# Patient Record
Sex: FEMALE | Race: WHITE | NOT HISPANIC OR LATINO | Employment: FULL TIME | URBAN - METROPOLITAN AREA
[De-identification: names, ages, dates, MRNs, and addresses within clinical notes are randomized per-mention and may not be internally consistent; named-entity substitution may affect disease eponyms.]

---

## 2019-04-12 ENCOUNTER — LAB (OUTPATIENT)
Dept: LAB | Facility: HOSPITAL | Age: 24
End: 2019-04-12

## 2019-04-12 ENCOUNTER — TRANSCRIBE ORDERS (OUTPATIENT)
Dept: LAB | Facility: HOSPITAL | Age: 24
End: 2019-04-12

## 2019-04-12 DIAGNOSIS — O02.1 MISSED ABORTION: Primary | ICD-10-CM

## 2019-04-12 DIAGNOSIS — O02.1 MISSED ABORTION: ICD-10-CM

## 2019-04-12 LAB — HCG INTACT+B SERPL-ACNC: 40.73 MIU/ML

## 2019-04-12 PROCEDURE — 36415 COLL VENOUS BLD VENIPUNCTURE: CPT

## 2019-04-12 PROCEDURE — 84702 CHORIONIC GONADOTROPIN TEST: CPT

## 2019-04-26 ENCOUNTER — LAB (OUTPATIENT)
Dept: LAB | Facility: HOSPITAL | Age: 24
End: 2019-04-26

## 2019-04-26 ENCOUNTER — TRANSCRIBE ORDERS (OUTPATIENT)
Dept: LAB | Facility: HOSPITAL | Age: 24
End: 2019-04-26

## 2019-04-26 DIAGNOSIS — O02.1 MISSED ABORTION: Primary | ICD-10-CM

## 2019-04-26 DIAGNOSIS — O02.1 MISSED ABORTION: ICD-10-CM

## 2019-04-26 LAB — HCG INTACT+B SERPL-ACNC: 0.68 MIU/ML

## 2019-04-26 PROCEDURE — 84702 CHORIONIC GONADOTROPIN TEST: CPT

## 2019-04-26 PROCEDURE — 36415 COLL VENOUS BLD VENIPUNCTURE: CPT

## 2020-02-05 ENCOUNTER — NURSE TRIAGE (OUTPATIENT)
Dept: CALL CENTER | Facility: HOSPITAL | Age: 25
End: 2020-02-05

## 2020-02-05 ENCOUNTER — HOSPITAL ENCOUNTER (EMERGENCY)
Facility: HOSPITAL | Age: 25
Discharge: HOME | End: 2020-02-05
Attending: EMERGENCY MEDICINE | Admitting: EMERGENCY MEDICINE

## 2020-02-05 VITALS
RESPIRATION RATE: 15 BRPM | HEIGHT: 63 IN | BODY MASS INDEX: 42.24 KG/M2 | HEART RATE: 93 BPM | WEIGHT: 238.4 LBS | OXYGEN SATURATION: 99 % | TEMPERATURE: 98.6 F | SYSTOLIC BLOOD PRESSURE: 118 MMHG | DIASTOLIC BLOOD PRESSURE: 78 MMHG

## 2020-02-05 DIAGNOSIS — O20.0 THREATENED MISCARRIAGE: Primary | ICD-10-CM

## 2020-02-05 LAB
ABO GROUP BLD: NORMAL
ALBUMIN SERPL-MCNC: 4.1 G/DL (ref 3.5–5.2)
ALBUMIN/GLOB SERPL: 1.2 G/DL
ALP SERPL-CCNC: 97 U/L (ref 39–117)
ALT SERPL W P-5'-P-CCNC: 19 U/L (ref 1–33)
ANION GAP SERPL CALCULATED.3IONS-SCNC: 11.9 MMOL/L (ref 5–15)
AST SERPL-CCNC: 22 U/L (ref 1–32)
B-HCG UR QL: NEGATIVE
BACTERIA UR QL AUTO: ABNORMAL /HPF
BASOPHILS # BLD AUTO: 0.08 10*3/MM3 (ref 0–0.2)
BASOPHILS NFR BLD AUTO: 0.8 % (ref 0–1.5)
BILIRUB SERPL-MCNC: 0.2 MG/DL (ref 0.2–1.2)
BILIRUB UR QL STRIP: NEGATIVE
BUN BLD-MCNC: 12 MG/DL (ref 6–20)
BUN/CREAT SERPL: 16.7 (ref 7–25)
CALCIUM SPEC-SCNC: 9.1 MG/DL (ref 8.6–10.5)
CHLORIDE SERPL-SCNC: 102 MMOL/L (ref 98–107)
CLARITY UR: CLEAR
CO2 SERPL-SCNC: 25.1 MMOL/L (ref 22–29)
COLOR UR: YELLOW
CREAT BLD-MCNC: 0.72 MG/DL (ref 0.57–1)
DEPRECATED RDW RBC AUTO: 40.7 FL (ref 37–54)
EOSINOPHIL # BLD AUTO: 0.13 10*3/MM3 (ref 0–0.4)
EOSINOPHIL NFR BLD AUTO: 1.4 % (ref 0.3–6.2)
ERYTHROCYTE [DISTWIDTH] IN BLOOD BY AUTOMATED COUNT: 13.2 % (ref 12.3–15.4)
GFR SERPL CREATININE-BSD FRML MDRD: 100 ML/MIN/1.73
GLOBULIN UR ELPH-MCNC: 3.5 GM/DL
GLUCOSE BLD-MCNC: 135 MG/DL (ref 65–99)
GLUCOSE UR STRIP-MCNC: NEGATIVE MG/DL
HCG INTACT+B SERPL-ACNC: 16.3 MIU/ML
HCT VFR BLD AUTO: 39.3 % (ref 34–46.6)
HGB BLD-MCNC: 12.8 G/DL (ref 12–15.9)
HGB UR QL STRIP.AUTO: ABNORMAL
HYALINE CASTS UR QL AUTO: ABNORMAL /LPF
IMM GRANULOCYTES # BLD AUTO: 0.02 10*3/MM3 (ref 0–0.05)
IMM GRANULOCYTES NFR BLD AUTO: 0.2 % (ref 0–0.5)
KETONES UR QL STRIP: NEGATIVE
LEUKOCYTE ESTERASE UR QL STRIP.AUTO: NEGATIVE
LYMPHOCYTES # BLD AUTO: 3.17 10*3/MM3 (ref 0.7–3.1)
LYMPHOCYTES NFR BLD AUTO: 33.2 % (ref 19.6–45.3)
MCH RBC QN AUTO: 27.6 PG (ref 26.6–33)
MCHC RBC AUTO-ENTMCNC: 32.6 G/DL (ref 31.5–35.7)
MCV RBC AUTO: 84.7 FL (ref 79–97)
MONOCYTES # BLD AUTO: 0.68 10*3/MM3 (ref 0.1–0.9)
MONOCYTES NFR BLD AUTO: 7.1 % (ref 5–12)
NEUTROPHILS # BLD AUTO: 5.46 10*3/MM3 (ref 1.7–7)
NEUTROPHILS NFR BLD AUTO: 57.3 % (ref 42.7–76)
NITRITE UR QL STRIP: NEGATIVE
NRBC BLD AUTO-RTO: 0 /100 WBC (ref 0–0.2)
PH UR STRIP.AUTO: 5.5 [PH] (ref 5–8)
PLATELET # BLD AUTO: 402 10*3/MM3 (ref 140–450)
PMV BLD AUTO: 9.3 FL (ref 6–12)
POTASSIUM BLD-SCNC: 3.5 MMOL/L (ref 3.5–5.2)
PROT SERPL-MCNC: 7.6 G/DL (ref 6–8.5)
PROT UR QL STRIP: NEGATIVE
RBC # BLD AUTO: 4.64 10*6/MM3 (ref 3.77–5.28)
RBC # UR: ABNORMAL /HPF
REF LAB TEST METHOD: ABNORMAL
RH BLD: NEGATIVE
SODIUM BLD-SCNC: 139 MMOL/L (ref 136–145)
SP GR UR STRIP: 1.02 (ref 1–1.03)
SQUAMOUS #/AREA URNS HPF: ABNORMAL /HPF
UROBILINOGEN UR QL STRIP: ABNORMAL
WBC NRBC COR # BLD: 9.54 10*3/MM3 (ref 3.4–10.8)
WBC UR QL AUTO: ABNORMAL /HPF

## 2020-02-05 PROCEDURE — 81025 URINE PREGNANCY TEST: CPT | Performed by: PHYSICIAN ASSISTANT

## 2020-02-05 PROCEDURE — 80053 COMPREHEN METABOLIC PANEL: CPT | Performed by: PHYSICIAN ASSISTANT

## 2020-02-05 PROCEDURE — 99283 EMERGENCY DEPT VISIT LOW MDM: CPT

## 2020-02-05 PROCEDURE — 36415 COLL VENOUS BLD VENIPUNCTURE: CPT

## 2020-02-05 PROCEDURE — 81001 URINALYSIS AUTO W/SCOPE: CPT | Performed by: PHYSICIAN ASSISTANT

## 2020-02-05 PROCEDURE — 84702 CHORIONIC GONADOTROPIN TEST: CPT | Performed by: PHYSICIAN ASSISTANT

## 2020-02-05 PROCEDURE — 86901 BLOOD TYPING SEROLOGIC RH(D): CPT | Performed by: PHYSICIAN ASSISTANT

## 2020-02-05 PROCEDURE — 85025 COMPLETE CBC W/AUTO DIFF WBC: CPT | Performed by: PHYSICIAN ASSISTANT

## 2020-02-05 PROCEDURE — 86900 BLOOD TYPING SEROLOGIC ABO: CPT | Performed by: PHYSICIAN ASSISTANT

## 2020-02-05 NOTE — ED PROVIDER NOTES
Subjective   24-year-old female presents with pregnancy and vaginal bleeding, she began to have spotting last night, and then had vaginal bleeding today, she is had to use a pad because of the bleeding.  She is approximately 5 weeks pregnant, her first appointment is in 2 weeks.  She is had previous miscarriage.  She had some lower abdominal cramping but no pain.  No other symptoms reported.      History provided by:  Patient   used: No        Review of Systems   Genitourinary: Positive for vaginal bleeding.   All other systems reviewed and are negative.      Past Medical History:   Diagnosis Date   • DVT (deep venous thrombosis) (CMS/Formerly [facility])        No Known Allergies    History reviewed. No pertinent surgical history.    History reviewed. No pertinent family history.    Social History     Socioeconomic History   • Marital status: Single     Spouse name: Not on file   • Number of children: Not on file   • Years of education: Not on file   • Highest education level: Not on file   Tobacco Use   • Smoking status: Never Smoker   Substance and Sexual Activity   • Alcohol use: Not Currently   • Drug use: Never           Objective   Physical Exam   Constitutional: She is oriented to person, place, and time. She appears well-developed and well-nourished.   HENT:   Head: Normocephalic.   Eyes: EOM are normal.   Neck: Normal range of motion. Neck supple.   Cardiovascular: Normal rate and regular rhythm.   Pulmonary/Chest: Effort normal and breath sounds normal.   Abdominal: Soft. Bowel sounds are normal. She exhibits no distension. There is no tenderness.   Musculoskeletal: Normal range of motion.   Neurological: She is alert and oriented to person, place, and time. She has normal reflexes.   Skin: Skin is warm and dry.   Psychiatric: She has a normal mood and affect.   Nursing note and vitals reviewed.      Procedures           ED Course  ED Course as of Feb 05 1652   Wed Feb 05, 2020   1627 HCG  Quantitative: 16.30 [CS]   1627 HCG Quantitative: 16.30 [CS]   1650 Discussed with Dr. vera, she recommended keeping current appointment, hCG will be repeated    [CS]      ED Course User Index  [CS] Mane Ordaz Jr., PA-C                                               MDM  Number of Diagnoses or Management Options  Threatened miscarriage: new and requires workup     Amount and/or Complexity of Data Reviewed  Clinical lab tests: reviewed  Tests in the radiology section of CPT®: reviewed  Decide to obtain previous medical records or to obtain history from someone other than the patient: yes  Discuss the patient with other providers: yes    Risk of Complications, Morbidity, and/or Mortality  Presenting problems: minimal  Diagnostic procedures: minimal  Management options: minimal    Patient Progress  Patient progress: stable      Final diagnoses:   Threatened miscarriage            Mane Ordaz Jr., PA-C  02/05/20 [id number]

## 2020-02-05 NOTE — ED NOTES
Dr. Black was called per RUTHIE Gilliam. The call was transferred at this time.     Rossy Coombs  02/05/20 7877

## 2020-02-05 NOTE — TELEPHONE ENCOUNTER
"Gave number to medical records, [facility] department [phone].    Reason for Disposition  • Health Information question, no triage required and triager able to answer question    Additional Information  • Negative: [1] Caller is not with the adult (patient) AND [2] reporting urgent symptoms  • Negative: Lab result questions  • Negative: Medication questions  • Negative: Caller can't be reached by phone  • Negative: Caller has already spoken to PCP or another triager  • Negative: RN needs further essential information from caller in order to complete triage  • Negative: Requesting regular office appointment  • Negative: [1] Caller requesting NON-URGENT health information AND [2] PCP's office is the best resource    Answer Assessment - Initial Assessment Questions  1. REASON FOR CALL or QUESTION: \"What is your reason for calling today?\" or \"How can I best help you?\" or \"What question do you have that I can help answer?\"      HCG level results    Protocols used: INFORMATION ONLY CALL-ADULT-      "

## 2020-02-18 ENCOUNTER — OFFICE VISIT (OUTPATIENT)
Dept: OBSTETRICS AND GYNECOLOGY | Facility: CLINIC | Age: 25
End: 2020-02-18

## 2020-02-18 VITALS
BODY MASS INDEX: 42.52 KG/M2 | SYSTOLIC BLOOD PRESSURE: 112 MMHG | DIASTOLIC BLOOD PRESSURE: 72 MMHG | WEIGHT: 240 LBS | HEIGHT: 63 IN

## 2020-02-18 DIAGNOSIS — Z31.69 PRE-CONCEPTION COUNSELING: ICD-10-CM

## 2020-02-18 DIAGNOSIS — Z12.4 SCREENING FOR MALIGNANT NEOPLASM OF CERVIX: ICD-10-CM

## 2020-02-18 DIAGNOSIS — O36.80X0 ENCOUNTER TO DETERMINE FETAL VIABILITY OF PREGNANCY, SINGLE OR UNSPECIFIED FETUS: ICD-10-CM

## 2020-02-18 DIAGNOSIS — O03.9 SAB (SPONTANEOUS ABORTION): ICD-10-CM

## 2020-02-18 DIAGNOSIS — Z31.9 INFERTILITY MANAGEMENT: ICD-10-CM

## 2020-02-18 DIAGNOSIS — N96 HISTORY OF RECURRENT MISCARRIAGES: Primary | ICD-10-CM

## 2020-02-18 PROCEDURE — 99204 OFFICE O/P NEW MOD 45 MIN: CPT | Performed by: OBSTETRICS & GYNECOLOGY

## 2020-02-18 NOTE — PROGRESS NOTES
Subjective   Chief Complaint   Patient presents with   • Threatened Miscarriage     Was seen in ED on 20 for bleeding, had positive UPT in December and January. UPT in office today negative. TVS done today.     Noelle Astudillo is a 24 y.o. year old  presenting to be seen for possible miscarriage and infertility.    She presents today as a follow-up visit from the emergency room.  She had positive home pregnancy tests in December and January.  And hCG performed in the ED on  was 16.  She experienced heavy vaginal bleeding prior to and following that ED visit.  Her bleeding now has resolved.  Her UPT is negative in the office today.  She also experienced a miscarriage roughly 12 months ago.  She and her partner have been attempting conception for the past year.  Neither 1 has a pertinent medical history.  Neither one is taking medications on a daily basis.  She reports a regular monthly cycle that typically last for 4 days.  Her flow is moderate.  She denies significant pain symptoms with menses.  They would very much like to be pregnant in the near future.  She is taking a daily prenatal vitamin.    OB Hx: 2 first trimester miscarriages  Pap smear: , denies abnormals  Mammogram: never  Colonoscopy: never  DEXA Scan: never    She denies nausea, emesis, fevers, chills, significant weight changes, hair/skin/nail changes, dysuria, urinary frequency, palpitations, chest pain, headaches, myalgia, dyspnea.     History  Past Medical History:   Diagnosis Date   • DVT (deep venous thrombosis) (CMS/HCC)    • Urinary tract infection    , Past Surgical History:   Procedure Laterality Date   • VEIN LIGATION     , Family History   Problem Relation Age of Onset   • Hypertension Father    • Colon cancer Paternal Grandfather    • Prostate cancer Paternal Grandfather    • Clotting disorder Paternal Grandmother    • Diabetes Maternal Grandfather    • Heart disease Maternal Grandfather    • Breast cancer Paternal  "Aunt    , Social History     Tobacco Use   • Smoking status: Former Smoker   • Smokeless tobacco: Never Used   Substance Use Topics   • Alcohol use: Yes     Comment: 5 drinks monthly   • Drug use: Never   ,   (Not in a hospital admission) and Allergies:  Patient has no known allergies.    No current outpatient medications on file prior to visit.     No current facility-administered medications on file prior to visit.        Social History    Tobacco Use      Smoking status: Former Smoker      Smokeless tobacco: Never Used      Review of Systems  Pertinent items are noted in HPI, all other systems were reviewed and negative       Objective   /72   Ht 160 cm (63\")   Wt 109 kg (240 lb)   LMP 02/05/2020   Breastfeeding No   BMI 42.51 kg/m²     Physical Exam:  General Appearance: alert, pleasant, appears stated age, interactive and cooperative  Head: normocephalic, without obvious abnormality and atraumatic  Eyes: lids and lashes normal and no icterus  Ears: ears appear intact with no abnormalities noted  Nose: nares normal, septum midline, mucosa normal and no drainage  Neck: suppple, trachea midline and no thyromegaly  Back: no kyphosis present, no scoliosis present and range of motion normal  Lungs: respirations regular, respirations even and respirations unlabored, clear to auscultation bilaterally   Heart: regular rhythm and normal rate, normal S1, S2, no murmur, gallop, or rubs and no click  Breasts: Not performed.  Abdomen: no masses, no hepatomegaly, no splenomegaly, soft non-tender, no guarding and no rebound tenderness  Extremities: moves extremities well, no edema, no cyanosis and no redness  Skin: no bleeding, bruising or rash and no lesions noted  Lymph Nodes: no palpable adenopathy  Neurologic: Cranial Nerves cranial nerves 2 - 12 grossly intact, Speech normal content and execusion, Coordination normal  Psych: normal mood and affect, oriented to person, time and place, thought content organized " and appropriate judgment    Pelvis:  Pelvic: Clinical staff was present for exam  External genitalia:  normal appearance of the external genitalia including Bartholin's and [place]'s glands.  :  urethral meatus normal;  Vagina:  normal pink mucosa without prolapse or lesions.  Cervix:  normal appearance.  Uterus:  normal size, shape and consistency.  Adnexa:  normal bimanual exam of the adnexa.  Rectal:  digital rectal exam not performed; anus visually normal appearing.    Review of Labs:   HCG     Review of Imaging:  Pelvic ultrasound report    Decision to obtain old records:  No.    Summarization of old records:  N/A    Independent review of image, tracing or specimen:  A pelvic ultrasound was ordered and independently reviewed today. Normal sized, anteverted uterus with no masses.  No intrauterine pregnancy is visualized.  Both ovaries have multiple small follicles and normal vascularity.  Small moderate free fluid in the cul-de-sac.       Assessment   Spontaneous , completed  Recurrent first trimester spontaneous abortions  Infertility  Preconception counseling  Screening for malignancy of the cervix     Plan    Orders Placed This Encounter   Procedures   • NuSwab VG+ - Swab, Cervix   • US Ob Transvaginal     Order Specific Question:   Reason for Exam:     Answer:   dates, NOB   • Lupus Anticoagulant Panel     Medications ordered: none    Procedures performed: pap smear    We reviewed her situation in detail today.  Her ultrasound and exam are reassuring.  She is likely ovulatory.  Her partner is unlikely to be contributing a male factor.  A Pap smear and vaginal cultures were collected today.  Antiphospholipid antibody testing was discussed and the patient desires to have this panel drawn today.  We will discuss testing results by phone.  We discussed the possibility of Clomid as well.  All questions answered.    Hector Wilson MD  Obstetrics and Gynecology  [facility]

## 2020-02-20 LAB
A VAGINAE DNA VAG QL NAA+PROBE: NORMAL SCORE
BVAB2 DNA VAG QL NAA+PROBE: NORMAL SCORE
C ALBICANS DNA VAG QL NAA+PROBE: NEGATIVE
C GLABRATA DNA VAG QL NAA+PROBE: NEGATIVE
C TRACH RRNA SPEC QL NAA+PROBE: NEGATIVE
MEGA1 DNA VAG QL NAA+PROBE: NORMAL SCORE
N GONORRHOEA RRNA SPEC QL NAA+PROBE: NEGATIVE
T VAGINALIS RRNA SPEC QL NAA+PROBE: NEGATIVE

## 2020-02-25 DIAGNOSIS — Z12.4 SCREENING FOR MALIGNANT NEOPLASM OF CERVIX: ICD-10-CM

## 2020-02-25 LAB
APTT HEX PL PPP: 5 SEC
APTT IMM NP PPP: NORMAL SEC
APTT PPP 1:1 SALINE: NORMAL SEC
APTT PPP: 27.2 SEC
B2 GLYCOPROT1 IGA SER-ACNC: <10 SAU
B2 GLYCOPROT1 IGG SER-ACNC: <10 SGU
B2 GLYCOPROT1 IGM SER-ACNC: <10 SMU
CARDIOLIPIN IGG SER IA-ACNC: <10 GPL
CARDIOLIPIN IGM SER IA-ACNC: 18 MPL
CONFIRM APTT: 1.2 SEC
CONFIRM DRVVT: NORMAL SEC
DRVVT SCREEN TO CONFIRM RATIO: NORMAL RATIO
INR PPP: 0.9 RATIO
LABORATORY COMMENT REPORT: NORMAL
PROTHROMBIN TIME: 9.8 SEC
SCREEN DRVVT: 42.5 SEC
THROMBIN TIME: 17.1 SEC

## 2020-03-04 ENCOUNTER — TELEPHONE (OUTPATIENT)
Dept: OBSTETRICS AND GYNECOLOGY | Facility: CLINIC | Age: 25
End: 2020-03-04

## 2020-03-05 DIAGNOSIS — Z31.9 INFERTILITY MANAGEMENT: Primary | ICD-10-CM

## 2020-03-05 NOTE — TELEPHONE ENCOUNTER
----- Message from Hayley Bellamy sent at 3/4/2020  9:36 AM EST -----  Contact: PT  THIS IS DR LIU'S. PT.  SHE CALLED REQUESTING RX FOR CLOMID SINCE HER TESTING WAS NEGATIVE.  SHE USES [facility] IN [place].  THANKS  
Done.  Please review the following instructions with her:    Clomid Instructions  - Your first day of bleeding is considered Day 1 of your cycle.  - Take Clomid daily for 5 days; Days 5-9.  - Have sex every other day (at least) on Days 9-17.  - Come to the lab for blood work on Day 21 (If Sunday, come on Monday Day 22)  - We will call you with the results and next steps     
Pt informed  
Routed to Dr Wilson   
room air

## 2020-03-24 DIAGNOSIS — Z31.9 INFERTILITY MANAGEMENT: Primary | ICD-10-CM

## 2020-03-24 DIAGNOSIS — Z31.9 INFERTILITY MANAGEMENT: ICD-10-CM

## 2020-03-25 LAB — PROGEST SERPL-MCNC: 23.5 NG/ML

## 2020-04-03 DIAGNOSIS — Z31.9 INFERTILITY MANAGEMENT: ICD-10-CM

## 2021-03-23 ENCOUNTER — BULK ORDERING (OUTPATIENT)
Dept: CASE MANAGEMENT | Facility: OTHER | Age: 26
End: 2021-03-23

## 2021-03-23 DIAGNOSIS — Z23 IMMUNIZATION DUE: ICD-10-CM

## 2021-09-07 ENCOUNTER — HOSPITAL ENCOUNTER (EMERGENCY)
Dept: HOSPITAL 79 - ER1 | Age: 26
LOS: 1 days | Discharge: HOME | End: 2021-09-08
Payer: SELF-PAY

## 2021-09-07 DIAGNOSIS — K81.9: Primary | ICD-10-CM

## 2021-09-07 DIAGNOSIS — F17.200: ICD-10-CM

## 2021-09-07 LAB
BUN/CREATININE RATIO: 15 (ref 0–10)
HGB BLD-MCNC: 12.1 GM/DL (ref 12.3–15.3)
RED BLOOD COUNT: 4.89 M/UL (ref 4–5.1)
WHITE BLOOD COUNT: 16.4 K/UL (ref 4.5–11)

## 2021-11-12 ENCOUNTER — LAB (OUTPATIENT)
Dept: LAB | Facility: HOSPITAL | Age: 26
End: 2021-11-12

## 2021-11-12 ENCOUNTER — TRANSCRIBE ORDERS (OUTPATIENT)
Dept: LAB | Facility: HOSPITAL | Age: 26
End: 2021-11-12

## 2021-11-12 DIAGNOSIS — Z34.81 PRENATAL CARE, SUBSEQUENT PREGNANCY, FIRST TRIMESTER: ICD-10-CM

## 2021-11-12 DIAGNOSIS — Z34.81 PRENATAL CARE, SUBSEQUENT PREGNANCY, FIRST TRIMESTER: Primary | ICD-10-CM

## 2021-11-12 DIAGNOSIS — Z3A.12 12 WEEKS GESTATION OF PREGNANCY: ICD-10-CM

## 2021-11-12 LAB
ABO GROUP BLD: NORMAL
AMPHET+METHAMPHET UR QL: NEGATIVE
AMPHETAMINES UR QL: NEGATIVE
BACTERIA UR QL AUTO: ABNORMAL /HPF
BARBITURATES UR QL SCN: NEGATIVE
BASOPHILS # BLD AUTO: 0.05 10*3/MM3 (ref 0–0.2)
BASOPHILS NFR BLD AUTO: 0.5 % (ref 0–1.5)
BENZODIAZ UR QL SCN: NEGATIVE
BILIRUB UR QL STRIP: NEGATIVE
BLD GP AB SCN SERPL QL: NEGATIVE
BUPRENORPHINE SERPL-MCNC: NEGATIVE NG/ML
CANNABINOIDS SERPL QL: NEGATIVE
CLARITY UR: ABNORMAL
COCAINE UR QL: NEGATIVE
COLOR UR: YELLOW
DEPRECATED RDW RBC AUTO: 41.4 FL (ref 37–54)
EOSINOPHIL # BLD AUTO: 0.08 10*3/MM3 (ref 0–0.4)
EOSINOPHIL NFR BLD AUTO: 0.9 % (ref 0.3–6.2)
ERYTHROCYTE [DISTWIDTH] IN BLOOD BY AUTOMATED COUNT: 14.4 % (ref 12.3–15.4)
GLUCOSE UR STRIP-MCNC: NEGATIVE MG/DL
HCT VFR BLD AUTO: 34.6 % (ref 34–46.6)
HGB BLD-MCNC: 11.4 G/DL (ref 12–15.9)
HGB UR QL STRIP.AUTO: NEGATIVE
HYALINE CASTS UR QL AUTO: ABNORMAL /LPF
IMM GRANULOCYTES # BLD AUTO: 0.02 10*3/MM3 (ref 0–0.05)
IMM GRANULOCYTES NFR BLD AUTO: 0.2 % (ref 0–0.5)
KETONES UR QL STRIP: NEGATIVE
LEUKOCYTE ESTERASE UR QL STRIP.AUTO: ABNORMAL
LYMPHOCYTES # BLD AUTO: 2.3 10*3/MM3 (ref 0.7–3.1)
LYMPHOCYTES NFR BLD AUTO: 24.5 % (ref 19.6–45.3)
MCH RBC QN AUTO: 26.5 PG (ref 26.6–33)
MCHC RBC AUTO-ENTMCNC: 32.9 G/DL (ref 31.5–35.7)
MCV RBC AUTO: 80.5 FL (ref 79–97)
METHADONE UR QL SCN: NEGATIVE
MONOCYTES # BLD AUTO: 0.45 10*3/MM3 (ref 0.1–0.9)
MONOCYTES NFR BLD AUTO: 4.8 % (ref 5–12)
NEUTROPHILS NFR BLD AUTO: 6.5 10*3/MM3 (ref 1.7–7)
NEUTROPHILS NFR BLD AUTO: 69.1 % (ref 42.7–76)
NITRITE UR QL STRIP: NEGATIVE
NRBC BLD AUTO-RTO: 0 /100 WBC (ref 0–0.2)
OPIATES UR QL: NEGATIVE
OXYCODONE UR QL SCN: NEGATIVE
PCP UR QL SCN: NEGATIVE
PH UR STRIP.AUTO: 6 [PH] (ref 5–8)
PLATELET # BLD AUTO: 304 10*3/MM3 (ref 140–450)
PMV BLD AUTO: 11.7 FL (ref 6–12)
PROPOXYPH UR QL: NEGATIVE
PROT UR QL STRIP: NEGATIVE
RBC # BLD AUTO: 4.3 10*6/MM3 (ref 3.77–5.28)
RBC # UR: ABNORMAL /HPF
REF LAB TEST METHOD: ABNORMAL
RH BLD: NEGATIVE
SP GR UR STRIP: 1.02 (ref 1–1.03)
SQUAMOUS #/AREA URNS HPF: ABNORMAL /HPF
TRICYCLICS UR QL SCN: NEGATIVE
UROBILINOGEN UR QL STRIP: ABNORMAL
WBC # BLD AUTO: 9.4 10*3/MM3 (ref 3.4–10.8)
WBC UR QL AUTO: ABNORMAL /HPF

## 2021-11-12 PROCEDURE — 85025 COMPLETE CBC W/AUTO DIFF WBC: CPT

## 2021-11-12 PROCEDURE — 86900 BLOOD TYPING SEROLOGIC ABO: CPT

## 2021-11-12 PROCEDURE — G0432 EIA HIV-1/HIV-2 SCREEN: HCPCS

## 2021-11-12 PROCEDURE — 81001 URINALYSIS AUTO W/SCOPE: CPT

## 2021-11-12 PROCEDURE — 82947 ASSAY GLUCOSE BLOOD QUANT: CPT

## 2021-11-12 PROCEDURE — 80081 OBSTETRIC PANEL INC HIV TSTG: CPT

## 2021-11-12 PROCEDURE — 36415 COLL VENOUS BLD VENIPUNCTURE: CPT

## 2021-11-12 PROCEDURE — 86850 RBC ANTIBODY SCREEN: CPT

## 2021-11-12 PROCEDURE — 86901 BLOOD TYPING SEROLOGIC RH(D): CPT

## 2021-11-12 PROCEDURE — 86803 HEPATITIS C AB TEST: CPT

## 2021-11-12 PROCEDURE — 80306 DRUG TEST PRSMV INSTRMNT: CPT

## 2021-11-12 PROCEDURE — 87340 HEPATITIS B SURFACE AG IA: CPT

## 2021-11-12 PROCEDURE — 87086 URINE CULTURE/COLONY COUNT: CPT

## 2021-11-12 PROCEDURE — 84443 ASSAY THYROID STIM HORMONE: CPT

## 2021-11-13 LAB
GLUCOSE SERPL-MCNC: 80 MG/DL (ref 65–99)
HBV SURFACE AG SERPL QL IA: NORMAL
HCV AB SER DONR QL: NORMAL
HIV1+2 AB SER QL: NORMAL
RPR SER QL: NORMAL
TSH SERPL DL<=0.05 MIU/L-ACNC: 0.53 UIU/ML (ref 0.27–4.2)

## 2021-11-14 LAB — BACTERIA SPEC AEROBE CULT: NO GROWTH

## 2021-11-15 LAB — RUBV IGG SERPL IA-ACNC: <0.9 INDEX

## 2022-05-16 PROBLEM — Z34.90 TERM PREGNANCY: Status: ACTIVE | Noted: 2022-05-16

## 2022-05-16 RX ORDER — SODIUM CHLORIDE 0.9 % (FLUSH) 0.9 %
3 SYRINGE (ML) INJECTION EVERY 12 HOURS SCHEDULED
Status: CANCELLED | OUTPATIENT
Start: 2022-05-16

## 2022-05-16 RX ORDER — LIDOCAINE HYDROCHLORIDE 10 MG/ML
5 INJECTION, SOLUTION EPIDURAL; INFILTRATION; INTRACAUDAL; PERINEURAL AS NEEDED
Status: CANCELLED | OUTPATIENT
Start: 2022-05-16

## 2022-05-16 RX ORDER — ACETAMINOPHEN 500 MG
1000 TABLET ORAL ONCE
Status: CANCELLED | OUTPATIENT
Start: 2022-05-16 | End: 2022-05-16

## 2022-05-16 RX ORDER — TRISODIUM CITRATE DIHYDRATE AND CITRIC ACID MONOHYDRATE 500; 334 MG/5ML; MG/5ML
30 SOLUTION ORAL ONCE
Status: CANCELLED | OUTPATIENT
Start: 2022-05-16 | End: 2022-05-16

## 2022-05-16 RX ORDER — SODIUM CHLORIDE 0.9 % (FLUSH) 0.9 %
3-10 SYRINGE (ML) INJECTION AS NEEDED
Status: CANCELLED | OUTPATIENT
Start: 2022-05-16

## 2022-05-16 RX ORDER — SODIUM CHLORIDE, SODIUM LACTATE, POTASSIUM CHLORIDE, CALCIUM CHLORIDE 600; 310; 30; 20 MG/100ML; MG/100ML; MG/100ML; MG/100ML
125 INJECTION, SOLUTION INTRAVENOUS CONTINUOUS
Status: CANCELLED | OUTPATIENT
Start: 2022-05-16

## 2022-05-17 ENCOUNTER — PRE-ADMISSION TESTING (OUTPATIENT)
Dept: PREADMISSION TESTING | Facility: HOSPITAL | Age: 27
End: 2022-05-17
Attending: OBSTETRICS & GYNECOLOGY

## 2022-05-17 VITALS — BODY MASS INDEX: 51.62 KG/M2 | HEIGHT: 62 IN | WEIGHT: 280.54 LBS

## 2022-05-17 LAB
ABO GROUP BLD: NORMAL
BLD GP AB SCN SERPL QL: NEGATIVE
DEPRECATED RDW RBC AUTO: 46.8 FL (ref 37–54)
ERYTHROCYTE [DISTWIDTH] IN BLOOD BY AUTOMATED COUNT: 15.8 % (ref 12.3–15.4)
HCT VFR BLD AUTO: 31.8 % (ref 34–46.6)
HGB BLD-MCNC: 10 G/DL (ref 12–15.9)
MCH RBC QN AUTO: 25.7 PG (ref 26.6–33)
MCHC RBC AUTO-ENTMCNC: 31.4 G/DL (ref 31.5–35.7)
MCV RBC AUTO: 81.7 FL (ref 79–97)
PLATELET # BLD AUTO: 264 10*3/MM3 (ref 140–450)
PMV BLD AUTO: 10.5 FL (ref 6–12)
RBC # BLD AUTO: 3.89 10*6/MM3 (ref 3.77–5.28)
RH BLD: NEGATIVE
SARS-COV-2 RNA PNL SPEC NAA+PROBE: NOT DETECTED
T&S EXPIRATION DATE: NORMAL
WBC NRBC COR # BLD: 10.64 10*3/MM3 (ref 3.4–10.8)

## 2022-05-17 PROCEDURE — 86901 BLOOD TYPING SEROLOGIC RH(D): CPT | Performed by: OBSTETRICS & GYNECOLOGY

## 2022-05-17 PROCEDURE — 86850 RBC ANTIBODY SCREEN: CPT | Performed by: OBSTETRICS & GYNECOLOGY

## 2022-05-17 PROCEDURE — 85027 COMPLETE CBC AUTOMATED: CPT | Performed by: OBSTETRICS & GYNECOLOGY

## 2022-05-17 PROCEDURE — U0004 COV-19 TEST NON-CDC HGH THRU: HCPCS | Performed by: OBSTETRICS & GYNECOLOGY

## 2022-05-17 PROCEDURE — 86900 BLOOD TYPING SEROLOGIC ABO: CPT | Performed by: OBSTETRICS & GYNECOLOGY

## 2022-05-17 RX ORDER — SODIUM CHLORIDE 0.9 % (FLUSH) 0.9 %
3-10 SYRINGE (ML) INJECTION AS NEEDED
Status: ACTIVE | OUTPATIENT
Start: 2022-05-17

## 2022-05-17 RX ORDER — ACETAMINOPHEN 500 MG
1000 TABLET ORAL ONCE
Status: SHIPPED | OUTPATIENT
Start: 2022-05-17

## 2022-05-17 RX ORDER — TRISODIUM CITRATE DIHYDRATE AND CITRIC ACID MONOHYDRATE 500; 334 MG/5ML; MG/5ML
30 SOLUTION ORAL ONCE
Status: SHIPPED | OUTPATIENT
Start: 2022-05-17

## 2022-05-17 RX ORDER — SODIUM CHLORIDE, SODIUM LACTATE, POTASSIUM CHLORIDE, CALCIUM CHLORIDE 600; 310; 30; 20 MG/100ML; MG/100ML; MG/100ML; MG/100ML
125 INJECTION, SOLUTION INTRAVENOUS CONTINUOUS
Status: SHIPPED | OUTPATIENT
Start: 2022-05-17

## 2022-05-17 RX ORDER — LIDOCAINE HYDROCHLORIDE 10 MG/ML
5 INJECTION, SOLUTION EPIDURAL; INFILTRATION; INTRACAUDAL; PERINEURAL AS NEEDED
Status: SHIPPED | OUTPATIENT
Start: 2022-05-17

## 2022-05-17 RX ORDER — SODIUM CHLORIDE 0.9 % (FLUSH) 0.9 %
3 SYRINGE (ML) INJECTION EVERY 12 HOURS SCHEDULED
Status: ACTIVE | OUTPATIENT
Start: 2022-05-17

## 2022-05-20 ENCOUNTER — HOSPITAL ENCOUNTER (INPATIENT)
Facility: HOSPITAL | Age: 27
LOS: 2 days | Discharge: HOME | End: 2022-05-22
Attending: OBSTETRICS & GYNECOLOGY | Admitting: OBSTETRICS & GYNECOLOGY

## 2022-05-20 ENCOUNTER — ANESTHESIA (OUTPATIENT)
Dept: LABOR AND DELIVERY | Facility: HOSPITAL | Age: 27
End: 2022-05-20

## 2022-05-20 ENCOUNTER — ANESTHESIA EVENT (OUTPATIENT)
Dept: LABOR AND DELIVERY | Facility: HOSPITAL | Age: 27
End: 2022-05-20

## 2022-05-20 DIAGNOSIS — O34.219 DELIVERED BY CESAREAN DELIVERY FOLLOWING PREVIOUS CESAREAN DELIVERY: Primary | ICD-10-CM

## 2022-05-20 LAB
HCT VFR BLD AUTO: 26 % (ref 34–46.6)
HGB BLD-MCNC: 8.3 G/DL (ref 12–15.9)

## 2022-05-20 PROCEDURE — 25010000002 ONDANSETRON PER 1 MG: Performed by: NURSE ANESTHETIST, CERTIFIED REGISTERED

## 2022-05-20 PROCEDURE — 25010000002 FENTANYL CITRATE (PF) 50 MCG/ML SOLUTION: Performed by: NURSE ANESTHETIST, CERTIFIED REGISTERED

## 2022-05-20 PROCEDURE — 25010000002 KETOROLAC TROMETHAMINE PER 15 MG: Performed by: OBSTETRICS & GYNECOLOGY

## 2022-05-20 PROCEDURE — 25010000002 MIDAZOLAM PER 1 MG: Performed by: NURSE ANESTHETIST, CERTIFIED REGISTERED

## 2022-05-20 PROCEDURE — 59025 FETAL NON-STRESS TEST: CPT

## 2022-05-20 PROCEDURE — 85014 HEMATOCRIT: CPT | Performed by: OBSTETRICS & GYNECOLOGY

## 2022-05-20 PROCEDURE — 0 MORPHINE PER 10 MG: Performed by: NURSE ANESTHETIST, CERTIFIED REGISTERED

## 2022-05-20 PROCEDURE — 85018 HEMOGLOBIN: CPT | Performed by: OBSTETRICS & GYNECOLOGY

## 2022-05-20 PROCEDURE — 25010000002 CEFAZOLIN PER 500 MG: Performed by: OBSTETRICS & GYNECOLOGY

## 2022-05-20 RX ORDER — TRISODIUM CITRATE DIHYDRATE AND CITRIC ACID MONOHYDRATE 500; 334 MG/5ML; MG/5ML
30 SOLUTION ORAL ONCE
Status: COMPLETED | OUTPATIENT
Start: 2022-05-20 | End: 2022-05-20

## 2022-05-20 RX ORDER — PROMETHAZINE HYDROCHLORIDE 12.5 MG/1
12.5 TABLET ORAL EVERY 6 HOURS PRN
Status: DISCONTINUED | OUTPATIENT
Start: 2022-05-20 | End: 2022-05-20 | Stop reason: HOSPADM

## 2022-05-20 RX ORDER — FENTANYL CITRATE 50 UG/ML
50 INJECTION, SOLUTION INTRAMUSCULAR; INTRAVENOUS
Status: CANCELLED | OUTPATIENT
Start: 2022-05-20

## 2022-05-20 RX ORDER — KETOROLAC TROMETHAMINE 15 MG/ML
15 INJECTION, SOLUTION INTRAMUSCULAR; INTRAVENOUS EVERY 6 HOURS
Status: COMPLETED | OUTPATIENT
Start: 2022-05-20 | End: 2022-05-21

## 2022-05-20 RX ORDER — IBUPROFEN 600 MG/1
600 TABLET ORAL EVERY 6 HOURS
Status: DISCONTINUED | OUTPATIENT
Start: 2022-05-21 | End: 2022-05-22 | Stop reason: HOSPADM

## 2022-05-20 RX ORDER — CARBOPROST TROMETHAMINE 250 UG/ML
250 INJECTION, SOLUTION INTRAMUSCULAR AS NEEDED
Status: DISCONTINUED | OUTPATIENT
Start: 2022-05-20 | End: 2022-05-20 | Stop reason: HOSPADM

## 2022-05-20 RX ORDER — MISOPROSTOL 200 UG/1
600 TABLET ORAL AS NEEDED
Status: DISCONTINUED | OUTPATIENT
Start: 2022-05-20 | End: 2022-05-22 | Stop reason: HOSPADM

## 2022-05-20 RX ORDER — EPHEDRINE SULFATE 50 MG/ML
INJECTION, SOLUTION INTRAVENOUS AS NEEDED
Status: DISCONTINUED | OUTPATIENT
Start: 2022-05-20 | End: 2022-05-20 | Stop reason: SURG

## 2022-05-20 RX ORDER — OXYCODONE HYDROCHLORIDE 5 MG/1
5 TABLET ORAL EVERY 4 HOURS PRN
Status: DISCONTINUED | OUTPATIENT
Start: 2022-05-20 | End: 2022-05-22 | Stop reason: HOSPADM

## 2022-05-20 RX ORDER — OXYTOCIN/0.9 % SODIUM CHLORIDE 30/500 ML
85 PLASTIC BAG, INJECTION (ML) INTRAVENOUS ONCE
Status: COMPLETED | OUTPATIENT
Start: 2022-05-20 | End: 2022-05-20

## 2022-05-20 RX ORDER — KETOROLAC TROMETHAMINE 30 MG/ML
30 INJECTION, SOLUTION INTRAMUSCULAR; INTRAVENOUS ONCE
Status: COMPLETED | OUTPATIENT
Start: 2022-05-20 | End: 2022-05-20

## 2022-05-20 RX ORDER — FAMOTIDINE 10 MG/ML
INJECTION, SOLUTION INTRAVENOUS AS NEEDED
Status: DISCONTINUED | OUTPATIENT
Start: 2022-05-20 | End: 2022-05-20 | Stop reason: SURG

## 2022-05-20 RX ORDER — ACETAMINOPHEN 500 MG
1000 TABLET ORAL ONCE
Status: COMPLETED | OUTPATIENT
Start: 2022-05-20 | End: 2022-05-20

## 2022-05-20 RX ORDER — METHYLERGONOVINE MALEATE 0.2 MG/ML
200 INJECTION INTRAVENOUS AS NEEDED
Status: DISCONTINUED | OUTPATIENT
Start: 2022-05-20 | End: 2022-05-22 | Stop reason: HOSPADM

## 2022-05-20 RX ORDER — MIDAZOLAM HYDROCHLORIDE 1 MG/ML
INJECTION INTRAMUSCULAR; INTRAVENOUS AS NEEDED
Status: DISCONTINUED | OUTPATIENT
Start: 2022-05-20 | End: 2022-05-20 | Stop reason: SURG

## 2022-05-20 RX ORDER — OXYTOCIN/0.9 % SODIUM CHLORIDE 30/500 ML
PLASTIC BAG, INJECTION (ML) INTRAVENOUS AS NEEDED
Status: DISCONTINUED | OUTPATIENT
Start: 2022-05-20 | End: 2022-05-20 | Stop reason: SURG

## 2022-05-20 RX ORDER — ONDANSETRON 2 MG/ML
4 INJECTION INTRAMUSCULAR; INTRAVENOUS ONCE AS NEEDED
Status: CANCELLED | OUTPATIENT
Start: 2022-05-20

## 2022-05-20 RX ORDER — MORPHINE SULFATE 0.5 MG/ML
INJECTION, SOLUTION EPIDURAL; INTRATHECAL; INTRAVENOUS AS NEEDED
Status: DISCONTINUED | OUTPATIENT
Start: 2022-05-20 | End: 2022-05-20 | Stop reason: SURG

## 2022-05-20 RX ORDER — ENOXAPARIN SODIUM 100 MG/ML
40 INJECTION SUBCUTANEOUS EVERY 24 HOURS
Status: DISCONTINUED | OUTPATIENT
Start: 2022-05-21 | End: 2022-05-22 | Stop reason: HOSPADM

## 2022-05-20 RX ORDER — FENTANYL CITRATE 50 UG/ML
INJECTION, SOLUTION INTRAMUSCULAR; INTRAVENOUS AS NEEDED
Status: DISCONTINUED | OUTPATIENT
Start: 2022-05-20 | End: 2022-05-20 | Stop reason: SURG

## 2022-05-20 RX ORDER — SODIUM CHLORIDE, SODIUM LACTATE, POTASSIUM CHLORIDE, CALCIUM CHLORIDE 600; 310; 30; 20 MG/100ML; MG/100ML; MG/100ML; MG/100ML
1000 INJECTION, SOLUTION INTRAVENOUS CONTINUOUS
Status: ACTIVE | OUTPATIENT
Start: 2022-05-20 | End: 2022-05-20

## 2022-05-20 RX ORDER — MISOPROSTOL 200 UG/1
800 TABLET ORAL AS NEEDED
Status: DISCONTINUED | OUTPATIENT
Start: 2022-05-20 | End: 2022-05-20 | Stop reason: HOSPADM

## 2022-05-20 RX ORDER — BUPIVACAINE HYDROCHLORIDE 7.5 MG/ML
INJECTION, SOLUTION INTRASPINAL AS NEEDED
Status: DISCONTINUED | OUTPATIENT
Start: 2022-05-20 | End: 2022-05-20 | Stop reason: SURG

## 2022-05-20 RX ORDER — ONDANSETRON 2 MG/ML
INJECTION INTRAMUSCULAR; INTRAVENOUS AS NEEDED
Status: DISCONTINUED | OUTPATIENT
Start: 2022-05-20 | End: 2022-05-20 | Stop reason: SURG

## 2022-05-20 RX ORDER — SODIUM CHLORIDE, SODIUM LACTATE, POTASSIUM CHLORIDE, CALCIUM CHLORIDE 600; 310; 30; 20 MG/100ML; MG/100ML; MG/100ML; MG/100ML
125 INJECTION, SOLUTION INTRAVENOUS CONTINUOUS
Status: DISCONTINUED | OUTPATIENT
Start: 2022-05-20 | End: 2022-05-22 | Stop reason: HOSPADM

## 2022-05-20 RX ORDER — HYDROXYZINE HYDROCHLORIDE 25 MG/1
50 TABLET, FILM COATED ORAL EVERY 6 HOURS PRN
Status: DISCONTINUED | OUTPATIENT
Start: 2022-05-20 | End: 2022-05-22 | Stop reason: HOSPADM

## 2022-05-20 RX ORDER — CALCIUM CARBONATE 200(500)MG
1 TABLET,CHEWABLE ORAL EVERY 4 HOURS PRN
Status: DISCONTINUED | OUTPATIENT
Start: 2022-05-20 | End: 2022-05-22 | Stop reason: HOSPADM

## 2022-05-20 RX ORDER — SODIUM CHLORIDE 0.9 % (FLUSH) 0.9 %
3-10 SYRINGE (ML) INJECTION AS NEEDED
Status: DISCONTINUED | OUTPATIENT
Start: 2022-05-20 | End: 2022-05-22 | Stop reason: HOSPADM

## 2022-05-20 RX ORDER — ACETAMINOPHEN 325 MG/1
650 TABLET ORAL EVERY 6 HOURS
Status: DISCONTINUED | OUTPATIENT
Start: 2022-05-21 | End: 2022-05-22 | Stop reason: HOSPADM

## 2022-05-20 RX ORDER — OXYCODONE HYDROCHLORIDE AND ACETAMINOPHEN 5; 325 MG/1; MG/1
1 TABLET ORAL EVERY 4 HOURS PRN
Status: DISCONTINUED | OUTPATIENT
Start: 2022-05-20 | End: 2022-05-20 | Stop reason: HOSPADM

## 2022-05-20 RX ORDER — DIPHENHYDRAMINE HYDROCHLORIDE 50 MG/ML
25 INJECTION INTRAMUSCULAR; INTRAVENOUS EVERY 4 HOURS PRN
Status: DISCONTINUED | OUTPATIENT
Start: 2022-05-20 | End: 2022-05-22 | Stop reason: HOSPADM

## 2022-05-20 RX ORDER — HYDROCORTISONE 25 MG/G
1 CREAM TOPICAL AS NEEDED
Status: DISCONTINUED | OUTPATIENT
Start: 2022-05-20 | End: 2022-05-22 | Stop reason: HOSPADM

## 2022-05-20 RX ORDER — OXYCODONE HYDROCHLORIDE 5 MG/1
10 TABLET ORAL EVERY 4 HOURS PRN
Status: DISCONTINUED | OUTPATIENT
Start: 2022-05-20 | End: 2022-05-22 | Stop reason: HOSPADM

## 2022-05-20 RX ORDER — SIMETHICONE 80 MG
80 TABLET,CHEWABLE ORAL 4 TIMES DAILY PRN
Status: DISCONTINUED | OUTPATIENT
Start: 2022-05-20 | End: 2022-05-22 | Stop reason: HOSPADM

## 2022-05-20 RX ORDER — DOCUSATE SODIUM 100 MG/1
100 CAPSULE, LIQUID FILLED ORAL 2 TIMES DAILY PRN
Status: DISCONTINUED | OUTPATIENT
Start: 2022-05-20 | End: 2022-05-22 | Stop reason: HOSPADM

## 2022-05-20 RX ORDER — PRENATAL VIT/IRON FUM/FOLIC AC 27MG-0.8MG
1 TABLET ORAL DAILY
Status: DISCONTINUED | OUTPATIENT
Start: 2022-05-20 | End: 2022-05-22 | Stop reason: HOSPADM

## 2022-05-20 RX ORDER — ACETAMINOPHEN 500 MG
1000 TABLET ORAL EVERY 6 HOURS
Status: COMPLETED | OUTPATIENT
Start: 2022-05-20 | End: 2022-05-21

## 2022-05-20 RX ORDER — PROMETHAZINE HYDROCHLORIDE 12.5 MG/1
12.5 TABLET ORAL EVERY 4 HOURS PRN
Status: DISCONTINUED | OUTPATIENT
Start: 2022-05-20 | End: 2022-05-22 | Stop reason: HOSPADM

## 2022-05-20 RX ORDER — SODIUM CHLORIDE 0.9 % (FLUSH) 0.9 %
3 SYRINGE (ML) INJECTION EVERY 12 HOURS SCHEDULED
Status: DISCONTINUED | OUTPATIENT
Start: 2022-05-20 | End: 2022-05-22 | Stop reason: HOSPADM

## 2022-05-20 RX ORDER — ALUMINA, MAGNESIA, AND SIMETHICONE 2400; 2400; 240 MG/30ML; MG/30ML; MG/30ML
15 SUSPENSION ORAL EVERY 4 HOURS PRN
Status: DISCONTINUED | OUTPATIENT
Start: 2022-05-20 | End: 2022-05-22 | Stop reason: HOSPADM

## 2022-05-20 RX ORDER — DIPHENHYDRAMINE HCL 25 MG
25 CAPSULE ORAL EVERY 4 HOURS PRN
Status: DISCONTINUED | OUTPATIENT
Start: 2022-05-20 | End: 2022-05-22 | Stop reason: HOSPADM

## 2022-05-20 RX ORDER — ONDANSETRON 4 MG/1
4 TABLET, FILM COATED ORAL EVERY 6 HOURS PRN
Status: DISCONTINUED | OUTPATIENT
Start: 2022-05-20 | End: 2022-05-20 | Stop reason: HOSPADM

## 2022-05-20 RX ORDER — METHYLERGONOVINE MALEATE 0.2 MG/ML
200 INJECTION INTRAVENOUS ONCE AS NEEDED
Status: DISCONTINUED | OUTPATIENT
Start: 2022-05-20 | End: 2022-05-20 | Stop reason: HOSPADM

## 2022-05-20 RX ORDER — CEFAZOLIN SODIUM IN 0.9 % NACL 3 G/100 ML
3 INTRAVENOUS SOLUTION, PIGGYBACK (ML) INTRAVENOUS ONCE
Status: COMPLETED | OUTPATIENT
Start: 2022-05-20 | End: 2022-05-20

## 2022-05-20 RX ORDER — ONDANSETRON 2 MG/ML
4 INJECTION INTRAMUSCULAR; INTRAVENOUS EVERY 6 HOURS PRN
Status: DISCONTINUED | OUTPATIENT
Start: 2022-05-20 | End: 2022-05-20 | Stop reason: HOSPADM

## 2022-05-20 RX ORDER — CARBOPROST TROMETHAMINE 250 UG/ML
250 INJECTION, SOLUTION INTRAMUSCULAR AS NEEDED
Status: DISCONTINUED | OUTPATIENT
Start: 2022-05-20 | End: 2022-05-22 | Stop reason: HOSPADM

## 2022-05-20 RX ORDER — NALOXONE HCL 0.4 MG/ML
0.4 VIAL (ML) INJECTION
Status: ACTIVE | OUTPATIENT
Start: 2022-05-20 | End: 2022-05-21

## 2022-05-20 RX ORDER — OXYTOCIN/0.9 % SODIUM CHLORIDE 30/500 ML
650 PLASTIC BAG, INJECTION (ML) INTRAVENOUS ONCE
Status: DISCONTINUED | OUTPATIENT
Start: 2022-05-20 | End: 2022-05-20 | Stop reason: HOSPADM

## 2022-05-20 RX ADMIN — SODIUM CITRATE AND CITRIC ACID MONOHYDRATE 30 ML: 500; 334 SOLUTION ORAL at 14:18

## 2022-05-20 RX ADMIN — ACETAMINOPHEN 1000 MG: 500 TABLET ORAL at 11:46

## 2022-05-20 RX ADMIN — MORPHINE SULFATE 0.15 MG: 0.5 INJECTION, SOLUTION EPIDURAL; INTRATHECAL; INTRAVENOUS at 14:27

## 2022-05-20 RX ADMIN — BUPIVACAINE HYDROCHLORIDE IN DEXTROSE 1.5 ML: 7.5 INJECTION, SOLUTION SUBARACHNOID at 14:27

## 2022-05-20 RX ADMIN — SODIUM CHLORIDE, POTASSIUM CHLORIDE, SODIUM LACTATE AND CALCIUM CHLORIDE: 600; 310; 30; 20 INJECTION, SOLUTION INTRAVENOUS at 14:52

## 2022-05-20 RX ADMIN — EPHEDRINE SULFATE 7.5 MG: 50 INJECTION, SOLUTION INTRAVENOUS at 14:35

## 2022-05-20 RX ADMIN — KETOROLAC TROMETHAMINE 15 MG: 15 INJECTION, SOLUTION INTRAMUSCULAR; INTRAVENOUS at 21:04

## 2022-05-20 RX ADMIN — FAMOTIDINE 20 MG: 10 INJECTION, SOLUTION INTRAVENOUS at 14:22

## 2022-05-20 RX ADMIN — FENTANYL CITRATE 20 MCG: 50 INJECTION, SOLUTION INTRAMUSCULAR; INTRAVENOUS at 14:27

## 2022-05-20 RX ADMIN — SODIUM CHLORIDE, POTASSIUM CHLORIDE, SODIUM LACTATE AND CALCIUM CHLORIDE: 600; 310; 30; 20 INJECTION, SOLUTION INTRAVENOUS at 14:20

## 2022-05-20 RX ADMIN — MIDAZOLAM 1 MG: 1 INJECTION INTRAMUSCULAR; INTRAVENOUS at 14:22

## 2022-05-20 RX ADMIN — Medication 85 ML/HR: at 16:06

## 2022-05-20 RX ADMIN — CEFAZOLIN 3 G: 10 INJECTION, POWDER, FOR SOLUTION INTRAVENOUS at 14:17

## 2022-05-20 RX ADMIN — ONDANSETRON 4 MG: 2 INJECTION INTRAMUSCULAR; INTRAVENOUS at 14:22

## 2022-05-20 RX ADMIN — KETOROLAC TROMETHAMINE 30 MG: 30 INJECTION, SOLUTION INTRAMUSCULAR; INTRAVENOUS at 16:04

## 2022-05-20 RX ADMIN — MIDAZOLAM 2 MG: 1 INJECTION INTRAMUSCULAR; INTRAVENOUS at 15:02

## 2022-05-20 RX ADMIN — OXYCODONE 10 MG: 5 TABLET ORAL at 19:42

## 2022-05-20 RX ADMIN — ACETAMINOPHEN 1000 MG: 500 TABLET ORAL at 17:22

## 2022-05-20 RX ADMIN — SODIUM CHLORIDE, POTASSIUM CHLORIDE, SODIUM LACTATE AND CALCIUM CHLORIDE 1000 ML: 600; 310; 30; 20 INJECTION, SOLUTION INTRAVENOUS at 14:16

## 2022-05-20 RX ADMIN — Medication 500 ML: at 14:51

## 2022-05-20 NOTE — ANESTHESIA POSTPROCEDURE EVALUATION
Patient: Noelle Astudillo    Procedure Summary     Date: 22 [facility] LAB[facility] DELIVERY   [facility]OR DELIV[facility]    Anesthesia Start:  Anesthesia Stop:     Procedure:  SECTION REPEAT (N/A Abdomen) Diagnosis:     Surgeons: Savi Rosa MD Provider: Adelso Martin MD    Anesthesia Type: spinal, ITN ASA Status: 2          Anesthesia Type: spinal, ITN    Vitals  Vitals Value Taken Time   /57 22 1515   Temp 97.5    Pulse 91    Resp 20    SpO2 95%            Post Anesthesia Care and Evaluation    Patient location during evaluation: bedside  Patient participation: complete - patient participated  Level of consciousness: awake and alert  Pain score: 0  Pain management: adequate  Airway patency: patent  Anesthetic complications: No anesthetic complications    Cardiovascular status: acceptable  Respiratory status: acceptable  Hydration status: acceptable

## 2022-05-20 NOTE — ANESTHESIA PROCEDURE NOTES
Spinal Block      Patient reassessed immediately prior to procedure    Indication:procedure for pain  Performed By  CRNA/CAA: Monisha Lemos CRNA  Preanesthetic Checklist  Completed: patient identified, IV checked, risks and benefits discussed, surgical consent, monitors and equipment checked, pre-op evaluation and timeout performed  Spinal Block Prep:  Patient Position:sitting  Sterile Tech:cap, gloves, mask and sterile barriers  Prep:Betadine  Patient Monitoring:blood pressure monitoring, continuous pulse oximetry and EKG  Spinal Block Procedure  Approach:midline  Guidance:palpation technique  Location:L2-L3  Needle Type:Minerva  Needle Gauge:25 G  Placement of Spinal needle event:cerebrospinal fluid aspirated  Paresthesia: no  Fluid Appearance:clear     Post Assessment  Patient Tolerance:patient tolerated the procedure well with no apparent complications  Complications no

## 2022-05-20 NOTE — ANESTHESIA PREPROCEDURE EVALUATION
Anesthesia Evaluation     Patient summary reviewed and Nursing notes reviewed   NPO Solid Status: > 8 hours  NPO Liquid Status: > 8 hours           Airway   Dental      Pulmonary - negative pulmonary ROS   Cardiovascular     (+) DVT,       Neuro/Psych- negative ROS  GI/Hepatic/Renal/Endo    (+) obesity, morbid obesity,      Musculoskeletal (-) negative ROS    Abdominal    Substance History - negative use     OB/GYN    (+) Pregnant,         Other                        Anesthesia Plan    ASA 2     spinal and ITN       Anesthetic plan, all risks, benefits, and alternatives have been provided, discussed and informed consent has been obtained with: patient.        CODE STATUS:

## 2022-05-21 PROBLEM — Z34.90 TERM PREGNANCY: Status: RESOLVED | Noted: 2022-05-16 | Resolved: 2022-05-21

## 2022-05-21 LAB
BASOPHILS # BLD AUTO: 0.06 10*3/MM3 (ref 0–0.2)
BASOPHILS NFR BLD AUTO: 0.6 % (ref 0–1.5)
DEPRECATED RDW RBC AUTO: 45.7 FL (ref 37–54)
EOSINOPHIL # BLD AUTO: 0.17 10*3/MM3 (ref 0–0.4)
EOSINOPHIL NFR BLD AUTO: 1.6 % (ref 0.3–6.2)
ERYTHROCYTE [DISTWIDTH] IN BLOOD BY AUTOMATED COUNT: 15.9 % (ref 12.3–15.4)
HCT VFR BLD AUTO: 26.9 % (ref 34–46.6)
HGB BLD-MCNC: 8.5 G/DL (ref 12–15.9)
IMM GRANULOCYTES # BLD AUTO: 0.05 10*3/MM3 (ref 0–0.05)
IMM GRANULOCYTES NFR BLD AUTO: 0.5 % (ref 0–0.5)
LYMPHOCYTES # BLD AUTO: 2.65 10*3/MM3 (ref 0.7–3.1)
LYMPHOCYTES NFR BLD AUTO: 24.7 % (ref 19.6–45.3)
MCH RBC QN AUTO: 25.4 PG (ref 26.6–33)
MCHC RBC AUTO-ENTMCNC: 31.6 G/DL (ref 31.5–35.7)
MCV RBC AUTO: 80.3 FL (ref 79–97)
MONOCYTES # BLD AUTO: 0.66 10*3/MM3 (ref 0.1–0.9)
MONOCYTES NFR BLD AUTO: 6.2 % (ref 5–12)
NEUTROPHILS NFR BLD AUTO: 66.4 % (ref 42.7–76)
NEUTROPHILS NFR BLD AUTO: 7.13 10*3/MM3 (ref 1.7–7)
NRBC BLD AUTO-RTO: 0 /100 WBC (ref 0–0.2)
PLATELET # BLD AUTO: 241 10*3/MM3 (ref 140–450)
PMV BLD AUTO: 10.8 FL (ref 6–12)
RBC # BLD AUTO: 3.35 10*6/MM3 (ref 3.77–5.28)
WBC NRBC COR # BLD: 10.72 10*3/MM3 (ref 3.4–10.8)

## 2022-05-21 PROCEDURE — 25010000002 ENOXAPARIN PER 10 MG: Performed by: OBSTETRICS & GYNECOLOGY

## 2022-05-21 PROCEDURE — 85025 COMPLETE CBC W/AUTO DIFF WBC: CPT | Performed by: OBSTETRICS & GYNECOLOGY

## 2022-05-21 PROCEDURE — 25010000002 KETOROLAC TROMETHAMINE PER 15 MG: Performed by: OBSTETRICS & GYNECOLOGY

## 2022-05-21 RX ORDER — FERROUS SULFATE 325(65) MG
325 TABLET ORAL 2 TIMES DAILY WITH MEALS
Status: DISCONTINUED | OUTPATIENT
Start: 2022-05-21 | End: 2022-05-22 | Stop reason: HOSPADM

## 2022-05-21 RX ADMIN — KETOROLAC TROMETHAMINE 15 MG: 15 INJECTION, SOLUTION INTRAMUSCULAR; INTRAVENOUS at 03:49

## 2022-05-21 RX ADMIN — DOCUSATE SODIUM 100 MG: 100 CAPSULE, LIQUID FILLED ORAL at 08:40

## 2022-05-21 RX ADMIN — OXYCODONE 10 MG: 5 TABLET ORAL at 14:50

## 2022-05-21 RX ADMIN — ENOXAPARIN SODIUM 40 MG: 40 INJECTION SUBCUTANEOUS at 05:48

## 2022-05-21 RX ADMIN — OXYCODONE 10 MG: 5 TABLET ORAL at 22:44

## 2022-05-21 RX ADMIN — ACETAMINOPHEN 650 MG: 325 TABLET ORAL at 17:54

## 2022-05-21 RX ADMIN — PRENATAL VITAMINS-IRON FUMARATE 27 MG IRON-FOLIC ACID 0.8 MG TABLET 1 TABLET: at 08:40

## 2022-05-21 RX ADMIN — KETOROLAC TROMETHAMINE 15 MG: 15 INJECTION, SOLUTION INTRAMUSCULAR; INTRAVENOUS at 14:50

## 2022-05-21 RX ADMIN — ACETAMINOPHEN 1000 MG: 500 TABLET ORAL at 12:30

## 2022-05-21 RX ADMIN — IBUPROFEN 600 MG: 600 TABLET ORAL at 20:55

## 2022-05-21 RX ADMIN — Medication 1 APPLICATION: at 08:40

## 2022-05-21 RX ADMIN — ACETAMINOPHEN 1000 MG: 500 TABLET ORAL at 00:05

## 2022-05-21 RX ADMIN — ACETAMINOPHEN 1000 MG: 500 TABLET ORAL at 05:48

## 2022-05-21 RX ADMIN — Medication 325 MG: at 17:54

## 2022-05-21 RX ADMIN — KETOROLAC TROMETHAMINE 15 MG: 15 INJECTION, SOLUTION INTRAMUSCULAR; INTRAVENOUS at 08:40

## 2022-05-21 RX ADMIN — OXYCODONE 10 MG: 5 TABLET ORAL at 05:58

## 2022-05-21 RX ADMIN — OXYCODONE 10 MG: 5 TABLET ORAL at 10:06

## 2022-05-21 NOTE — ANESTHESIA POSTPROCEDURE EVALUATION
Patient: Noelle Astudillo    Procedure Summary     Date: 22 Room / Location: [facility] LABOR DELIVERY   [facility]OR [facility]IVERY    Anesthesia Start: 1420 Anesthesia Stop:     Procedure:  SECTION REPEAT (N/A Abdomen) Diagnosis:     Surgeons: Savi Rosa MD Provider: Adelso Martin MD    Anesthesia Type: spinal, ITN ASA Status: 2          Anesthesia Type: spinal, ITN    Vitals  Vitals Value Taken Time   /59 22 0400   Temp 97.9 °F (36.6 °C) 22 0400   Pulse 89 22 0400   Resp 18 22 0400   SpO2 98 % 22 1616   Vitals shown include unvalidated device data.        Post Anesthesia Care and Evaluation    Patient location during evaluation: bedside  Patient participation: complete - patient participated  Level of consciousness: awake and awake and alert  Pain score: 0  Pain management: satisfactory to patient  Airway patency: patent  Anesthetic complications: No anesthetic complications  PONV Status: none  Cardiovascular status: acceptable, hemodynamically stable and stable  Respiratory status: acceptable  Hydration status: stable  Post Neuraxial Block status: Motor and sensory function returned to baseline and No signs or symptoms of PDPH

## 2022-05-22 VITALS
HEIGHT: 63 IN | WEIGHT: 275 LBS | TEMPERATURE: 98.1 F | HEART RATE: 88 BPM | BODY MASS INDEX: 48.73 KG/M2 | OXYGEN SATURATION: 97 % | DIASTOLIC BLOOD PRESSURE: 64 MMHG | SYSTOLIC BLOOD PRESSURE: 111 MMHG | RESPIRATION RATE: 16 BRPM

## 2022-05-22 PROCEDURE — 25010000002 ENOXAPARIN PER 10 MG: Performed by: OBSTETRICS & GYNECOLOGY

## 2022-05-22 RX ORDER — ACETAMINOPHEN 325 MG/1
650 TABLET ORAL EVERY 6 HOURS
Start: 2022-05-22

## 2022-05-22 RX ORDER — ENOXAPARIN SODIUM 100 MG/ML
40 INJECTION SUBCUTANEOUS EVERY 24 HOURS
Qty: 30 ML | Refills: 1 | Status: SHIPPED | OUTPATIENT
Start: 2022-05-23

## 2022-05-22 RX ORDER — PSEUDOEPHEDRINE HCL 30 MG
100 TABLET ORAL 2 TIMES DAILY PRN
Qty: 30 CAPSULE | Refills: 1 | Status: SHIPPED | OUTPATIENT
Start: 2022-05-22

## 2022-05-22 RX ORDER — PNV 112/IRON/FOLIC/OM3/DHA/EPA 3.33-.33MG
2 TABLET,CHEWABLE ORAL DAILY
Qty: 90 TABLET
Start: 2022-05-22

## 2022-05-22 RX ORDER — SIMETHICONE 80 MG
80 TABLET,CHEWABLE ORAL 4 TIMES DAILY PRN
Start: 2022-05-22

## 2022-05-22 RX ORDER — OXYCODONE HYDROCHLORIDE 5 MG/1
5 TABLET ORAL EVERY 4 HOURS PRN
Qty: 12 TABLET | Refills: 0 | Status: SHIPPED | OUTPATIENT
Start: 2022-05-22 | End: 2022-05-27

## 2022-05-22 RX ORDER — FERROUS SULFATE 325(65) MG
325 TABLET ORAL
Qty: 90 TABLET | Refills: 1 | Status: SHIPPED | OUTPATIENT
Start: 2022-05-22

## 2022-05-22 RX ORDER — IBUPROFEN 600 MG/1
600 TABLET ORAL EVERY 6 HOURS PRN
Qty: 60 TABLET | Refills: 0 | Status: SHIPPED | OUTPATIENT
Start: 2022-05-22

## 2022-05-22 RX ADMIN — ACETAMINOPHEN 650 MG: 325 TABLET ORAL at 05:54

## 2022-05-22 RX ADMIN — IBUPROFEN 600 MG: 600 TABLET ORAL at 03:15

## 2022-05-22 RX ADMIN — OXYCODONE 10 MG: 5 TABLET ORAL at 09:15

## 2022-05-22 RX ADMIN — PRENATAL VITAMINS-IRON FUMARATE 27 MG IRON-FOLIC ACID 0.8 MG TABLET 1 TABLET: at 09:14

## 2022-05-22 RX ADMIN — ACETAMINOPHEN 650 MG: 325 TABLET ORAL at 11:38

## 2022-05-22 RX ADMIN — ACETAMINOPHEN 650 MG: 325 TABLET ORAL at 00:46

## 2022-05-22 RX ADMIN — ENOXAPARIN SODIUM 40 MG: 40 INJECTION SUBCUTANEOUS at 05:54

## 2022-05-22 RX ADMIN — Medication 325 MG: at 09:13

## 2022-05-22 RX ADMIN — IBUPROFEN 600 MG: 600 TABLET ORAL at 09:15

## 2022-05-22 RX ADMIN — OXYCODONE 10 MG: 5 TABLET ORAL at 13:30

## 2024-03-28 ENCOUNTER — TRANSCRIBE ORDERS (OUTPATIENT)
Dept: LAB | Facility: HOSPITAL | Age: 29
End: 2024-03-28
Payer: MEDICAID

## 2024-03-28 ENCOUNTER — LAB (OUTPATIENT)
Dept: LAB | Facility: HOSPITAL | Age: 29
End: 2024-03-28
Payer: MEDICAID

## 2024-03-28 DIAGNOSIS — N92.6 IRREGULAR MENSTRUAL CYCLE: ICD-10-CM

## 2024-03-28 DIAGNOSIS — N92.6 IRREGULAR MENSTRUAL CYCLE: Primary | ICD-10-CM

## 2024-03-28 LAB
HCG INTACT+B SERPL-ACNC: NORMAL MIU/ML
PROGEST SERPL-MCNC: 21.1 NG/ML

## 2024-03-28 PROCEDURE — 84144 ASSAY OF PROGESTERONE: CPT

## 2024-03-28 PROCEDURE — 36415 COLL VENOUS BLD VENIPUNCTURE: CPT

## 2024-03-28 PROCEDURE — 84702 CHORIONIC GONADOTROPIN TEST: CPT

## 2024-08-21 ENCOUNTER — TRANSCRIBE ORDERS (OUTPATIENT)
Dept: LAB | Facility: HOSPITAL | Age: 29
End: 2024-08-21
Payer: MEDICAID

## 2024-08-21 ENCOUNTER — LAB (OUTPATIENT)
Dept: LAB | Facility: HOSPITAL | Age: 29
End: 2024-08-21
Payer: MEDICAID

## 2024-08-21 DIAGNOSIS — Z34.82 PRENATAL CARE, SUBSEQUENT PREGNANCY, SECOND TRIMESTER: ICD-10-CM

## 2024-08-21 DIAGNOSIS — Z34.82 PRENATAL CARE, SUBSEQUENT PREGNANCY, SECOND TRIMESTER: Primary | ICD-10-CM

## 2024-08-21 LAB
ABO GROUP BLD: NORMAL
BLD GP AB SCN SERPL QL: NEGATIVE
DEPRECATED RDW RBC AUTO: 40.6 FL (ref 37–54)
ERYTHROCYTE [DISTWIDTH] IN BLOOD BY AUTOMATED COUNT: 13.5 % (ref 12.3–15.4)
GLUCOSE 1H P 100 G GLC PO SERPL-MCNC: 120 MG/DL (ref 65–139)
HCT VFR BLD AUTO: 31.5 % (ref 34–46.6)
HGB BLD-MCNC: 10.3 G/DL (ref 12–15.9)
MCH RBC QN AUTO: 27.3 PG (ref 26.6–33)
MCHC RBC AUTO-ENTMCNC: 32.7 G/DL (ref 31.5–35.7)
MCV RBC AUTO: 83.6 FL (ref 79–97)
PLATELET # BLD AUTO: 273 10*3/MM3 (ref 140–450)
PMV BLD AUTO: 10.9 FL (ref 6–12)
RBC # BLD AUTO: 3.77 10*6/MM3 (ref 3.77–5.28)
RH BLD: NEGATIVE
TREPONEMA PALLIDUM IGG+IGM AB [PRESENCE] IN SERUM OR PLASMA BY IMMUNOASSAY: NORMAL
WBC NRBC COR # BLD AUTO: 11.61 10*3/MM3 (ref 3.4–10.8)

## 2024-08-21 PROCEDURE — 82950 GLUCOSE TEST: CPT

## 2024-08-21 PROCEDURE — 86850 RBC ANTIBODY SCREEN: CPT

## 2024-08-21 PROCEDURE — 36415 COLL VENOUS BLD VENIPUNCTURE: CPT

## 2024-08-21 PROCEDURE — 86901 BLOOD TYPING SEROLOGIC RH(D): CPT

## 2024-08-21 PROCEDURE — 85027 COMPLETE CBC AUTOMATED: CPT

## 2024-08-21 PROCEDURE — 86780 TREPONEMA PALLIDUM: CPT

## 2024-08-21 PROCEDURE — 86900 BLOOD TYPING SEROLOGIC ABO: CPT

## 2024-08-21 PROCEDURE — 82948 REAGENT STRIP/BLOOD GLUCOSE: CPT | Performed by: OBSTETRICS & GYNECOLOGY

## 2024-10-31 PROBLEM — Z34.90 TERM PREGNANCY: Status: ACTIVE | Noted: 2024-10-31

## 2024-11-06 ENCOUNTER — PRE-ADMISSION TESTING (OUTPATIENT)
Dept: PREADMISSION TESTING | Facility: HOSPITAL | Age: 29
End: 2024-11-06
Payer: MEDICAID

## 2024-11-06 VITALS — HEIGHT: 63 IN | BODY MASS INDEX: 47.38 KG/M2 | WEIGHT: 267.42 LBS

## 2024-11-06 DIAGNOSIS — O34.219 DELIVERED BY CESAREAN DELIVERY FOLLOWING PREVIOUS CESAREAN DELIVERY: ICD-10-CM

## 2024-11-06 LAB
ABO GROUP BLD: NORMAL
BLD GP AB SCN SERPL QL: NEGATIVE
DEPRECATED RDW RBC AUTO: 44.9 FL (ref 37–54)
ERYTHROCYTE [DISTWIDTH] IN BLOOD BY AUTOMATED COUNT: 15 % (ref 12.3–15.4)
HCT VFR BLD AUTO: 32.8 % (ref 34–46.6)
HGB BLD-MCNC: 10.4 G/DL (ref 12–15.9)
MCH RBC QN AUTO: 26.4 PG (ref 26.6–33)
MCHC RBC AUTO-ENTMCNC: 31.7 G/DL (ref 31.5–35.7)
MCV RBC AUTO: 83.2 FL (ref 79–97)
PLATELET # BLD AUTO: 273 10*3/MM3 (ref 140–450)
PMV BLD AUTO: 9.8 FL (ref 6–12)
RBC # BLD AUTO: 3.94 10*6/MM3 (ref 3.77–5.28)
RH BLD: NEGATIVE
T&S EXPIRATION DATE: NORMAL
TREPONEMA PALLIDUM IGG+IGM AB [PRESENCE] IN SERUM OR PLASMA BY IMMUNOASSAY: NORMAL
WBC NRBC COR # BLD AUTO: 12.26 10*3/MM3 (ref 3.4–10.8)

## 2024-11-06 PROCEDURE — 36415 COLL VENOUS BLD VENIPUNCTURE: CPT

## 2024-11-06 PROCEDURE — 86901 BLOOD TYPING SEROLOGIC RH(D): CPT

## 2024-11-06 PROCEDURE — 86780 TREPONEMA PALLIDUM: CPT | Performed by: OBSTETRICS & GYNECOLOGY

## 2024-11-06 PROCEDURE — 86900 BLOOD TYPING SEROLOGIC ABO: CPT

## 2024-11-06 PROCEDURE — 85027 COMPLETE CBC AUTOMATED: CPT

## 2024-11-06 PROCEDURE — 86850 RBC ANTIBODY SCREEN: CPT

## 2024-11-06 NOTE — PAT
Patient to apply Chlorhexadine wipes  to surgical area (as instructed) the night before procedure and the AM of procedure. Wipes provided.    Patient instructed to drink 20 ounces of Gatorade or Gatorlyte (if diabetic) and it needs to be completed 1 hour (for Main OR patients) or 2 hours (scheduled  section & [facility] patients) before given arrival time for procedure (NO RED Gatorade and NO Gatorade Zero).    Patient verbalized understanding.    The following information and instructions were given:    Do not eat, drink, smoke or chew gum after midnight the night before surgery. This includes no mints.  Take all routine, prescribed medications including heart and blood pressure medicines with a sip of water unless otherwise instructed by your physician.   Do NOT take diabetic medication unless instructed by your physician.      DO NOT shave for two days before your procedure.  Do not wear makeup.      DO NOT wear fingernail polish (gel/regular) and/or acrylic/artificial nails on the day of surgery. If you had a recent manicure and would rather not remove polish or artificial nails, the minimum requirement is that the polish/artificial nails must be removed from the middle finger on each hand.      If you are having surgery/procedure on an upper extremity, fingernail polish/artificial fingernails must be removed for surgery.  NO EXCEPTIONS.      If you are having surgery/procedure on a lower extremity, toenail polish on both extremities must be removed for surgery.  NO EXCEPTIONS.    Remove all jewelry (advise to go to jeweler if unable to remove).  Jewelry, especially rings, can no longer be taped for surgery.    Leave anything you consider valuable at home.    Leave your suitcase in the car until after your surgery if you are staying overnight.    Bring the following with you the day of your procedure (when applicable):       -Picture ID and insurance cards       -Co-pay/deductible required by insurance        -Medications in the original bottles or a detailed list (name, dosage, frequency of medications) including all over-the-counter medications if not brought to PAT       -Copy of advance directive, living will or power of  documents if not brought to PAT       -CPAP or BIPAP mask and tubing (do not bring machine)       -Skin prep instruction(s) sheet       -PAT Pass    Educational handout or binder (joint replacements) related to procedure given to patient.  Educational handout also includes general information related to the recovery that mentions signs and symptoms of infection and when to call the doctor.    When applicable, an ERAS handout was given to patient.    Respirex use and pneumonia prevention education provided in Pre Admission Testing general education video.    Information related to infection and hand hygiene mentioned in Pre Admission Testing general education video. Patient instructed to call their doctor if any of the following symptoms are noted during recovery:  Fever of 100.4 F or higher, incision that is warm or has increasing bleeding, redness or drainage.    DVT Prevention instructions given in general education video presentation during Pre Admission Testing appointment that stress the importance of ambulation to improve blood circulation.  Also encouraged patient to perform foot exercises when in bed and application of a sequential device may be applied to lower extremities to improve circulation.      Please apply Chlorhexidine wipes to surgical area (if instructed) the night before procedure and the AM of procedure and document date/time of applications on skin prep instruction sheet.    Blood bank bracelet applied to patient during Pre Admission Testing visit.  Patient instructed not to remove from arm until after procedure and they are discharged from the hospital.  Explained to patient that they may shower and get the bracelet wet, but not to immerse under water for longer  periods (bathing, swimming, hand dishwashing, etc).  Patient verbalized understanding.

## 2024-11-06 NOTE — DISCHARGE INSTRUCTIONS
What to know before your arrive:    -Do not eat, drink or chew gum beginning 8 hours before your scheduled arrival time to the hospital.  Except please drink 20 ounces of Gatorade and complete two hours before your given arrival time to the hospital.  If you drink too close to surgery time, your sugery may  be delayed or cancelled.  Please complete as instructed.     -Do not shave any part of your body including abdomen or pelvic are for two days before your procedure.  -If you are taking a scheduled medication (insulin, blood pressure medicine,antibiotics) please consult with your physician whether to take on the day of surgery.  -Remove all jewelry including rings, wedding bands, and piercing before coming to the hospital.  -Leave important valuables at home.  -Do not wear dark fingernail polish.  -Please take two Tylenol 500 mg tablets the evening before surgery.  -Bring the following with you to the hospital:    -Picture ID and insurance, Medicare or Medicaid cards    -Co-pay/deductible required by insurance (Cash, Check, Credit Card)    -Copy of living will or power  document (if applicable)    -CPAP mask and tubing, not machine (if applicable)    -Skin prep instructions sheet    What to know the day of procedure:    -Park in the [facility], take elevator for first floor, exit to the right and  proceed through the doors to outside, follow the covered sidewalk to the entrance of the [place] [facility], follow the hallway and signs to the [facility], enter the [facility] to your right BEFORE entering the 17[address] lobby.  Take the elevators to the 3rd floor ([address] [facility]).  -Leave unnecessary items in your vehicle, including your suitcase.  Your support  person or a family member can get it for you after your procedure.   -Check in at the reception desk in the lobby of the 3rd floor ([address] [facility]).   -One person may accompany you to the pre-op/recovery area.  Please have  other family members wait in the  waiting room.   -An anesthesiologist will meet with your prior to your procedure.   -After anesthesia has been initiated, one person may accompany you in the  operating room.   -No video cameras are permitted in the operating room; only still cameras,  Please.      What to expect while you are in recovery:     -One person may stay with you while you are in recovery.   -If the baby is stable, he/she may visit to initiate breastfeeding & [facility].      CHLORHEXIDINE GLUCONATE WIPES AND INSTRUCTIONS GIVEN TO PATIENT

## 2024-11-08 ENCOUNTER — ANESTHESIA (OUTPATIENT)
Dept: LABOR AND DELIVERY | Facility: HOSPITAL | Age: 29
End: 2024-11-08
Payer: MEDICAID

## 2024-11-08 ENCOUNTER — HOSPITAL ENCOUNTER (INPATIENT)
Facility: HOSPITAL | Age: 29
LOS: 2 days | Discharge: HOME | End: 2024-11-10
Attending: OBSTETRICS & GYNECOLOGY | Admitting: OBSTETRICS & GYNECOLOGY
Payer: MEDICAID

## 2024-11-08 ENCOUNTER — ANESTHESIA EVENT (OUTPATIENT)
Dept: LABOR AND DELIVERY | Facility: HOSPITAL | Age: 29
End: 2024-11-08
Payer: MEDICAID

## 2024-11-08 DIAGNOSIS — O34.219 DELIVERED BY CESAREAN DELIVERY FOLLOWING PREVIOUS CESAREAN DELIVERY: Primary | ICD-10-CM

## 2024-11-08 LAB
AMPHET+METHAMPHET UR QL: NEGATIVE
AMPHETAMINES UR QL: NEGATIVE
BARBITURATES UR QL SCN: NEGATIVE
BENZODIAZ UR QL SCN: NEGATIVE
BUPRENORPHINE SERPL-MCNC: NEGATIVE NG/ML
CANNABINOIDS SERPL QL: NEGATIVE
COCAINE UR QL: NEGATIVE
FENTANYL UR-MCNC: NEGATIVE NG/ML
HCT VFR BLD AUTO: 27.8 % (ref 34–46.6)
HGB BLD-MCNC: 8.8 G/DL (ref 12–15.9)
METHADONE UR QL SCN: NEGATIVE
OPIATES UR QL: NEGATIVE
OXYCODONE UR QL SCN: NEGATIVE
PCP UR QL SCN: NEGATIVE
TRICYCLICS UR QL SCN: NEGATIVE

## 2024-11-08 PROCEDURE — 25810000003 LACTATED RINGERS PER 1000 ML: Performed by: OBSTETRICS & GYNECOLOGY

## 2024-11-08 PROCEDURE — 59025 FETAL NON-STRESS TEST: CPT

## 2024-11-08 PROCEDURE — 25010000002 MORPHINE PER 10 MG: Performed by: ANESTHESIOLOGY

## 2024-11-08 PROCEDURE — 25810000003 LACTATED RINGERS SOLUTION: Performed by: OBSTETRICS & GYNECOLOGY

## 2024-11-08 PROCEDURE — 85018 HEMOGLOBIN: CPT | Performed by: OBSTETRICS & GYNECOLOGY

## 2024-11-08 PROCEDURE — 85014 HEMATOCRIT: CPT | Performed by: OBSTETRICS & GYNECOLOGY

## 2024-11-08 PROCEDURE — 94799 UNLISTED PULMONARY SVC/PX: CPT

## 2024-11-08 PROCEDURE — 80307 DRUG TEST PRSMV CHEM ANLYZR: CPT | Performed by: OBSTETRICS & GYNECOLOGY

## 2024-11-08 PROCEDURE — 25010000002 METHYLERGONOVINE MALEATE PER 0.2 MG: Performed by: ANESTHESIOLOGY

## 2024-11-08 PROCEDURE — 25010000002 ONDANSETRON PER 1 MG: Performed by: ANESTHESIOLOGY

## 2024-11-08 PROCEDURE — 25010000002 OXYTOCIN PER 10 UNITS: Performed by: ANESTHESIOLOGY

## 2024-11-08 PROCEDURE — 25010000002 FENTANYL CITRATE (PF) 100 MCG/2ML SOLUTION: Performed by: ANESTHESIOLOGY

## 2024-11-08 PROCEDURE — 25010000002 METOCLOPRAMIDE PER 10 MG: Performed by: ANESTHESIOLOGY

## 2024-11-08 PROCEDURE — 25010000002 KETOROLAC TROMETHAMINE PER 15 MG: Performed by: OBSTETRICS & GYNECOLOGY

## 2024-11-08 PROCEDURE — 25010000002 BUPIVACAINE IN DEXTROSE 0.75-8.25 % SOLUTION: Performed by: ANESTHESIOLOGY

## 2024-11-08 PROCEDURE — 25010000002 CEFAZOLIN 3 G RECONSTITUTED SOLUTION 1 EACH VIAL: Performed by: OBSTETRICS & GYNECOLOGY

## 2024-11-08 PROCEDURE — 25010000002 MIDAZOLAM PER 1 MG: Performed by: ANESTHESIOLOGY

## 2024-11-08 RX ORDER — DIPHENHYDRAMINE HYDROCHLORIDE 50 MG/ML
25 INJECTION INTRAMUSCULAR; INTRAVENOUS ONCE AS NEEDED
Status: CANCELLED | OUTPATIENT
Start: 2024-11-08

## 2024-11-08 RX ORDER — PRENATAL VIT/IRON FUM/FOLIC AC 27MG-0.8MG
1 TABLET ORAL DAILY
Status: DISCONTINUED | OUTPATIENT
Start: 2024-11-09 | End: 2024-11-10 | Stop reason: HOSPADM

## 2024-11-08 RX ORDER — METHYLERGONOVINE MALEATE 0.2 MG/ML
200 INJECTION INTRAVENOUS AS NEEDED
Status: DISCONTINUED | OUTPATIENT
Start: 2024-11-08 | End: 2024-11-10 | Stop reason: HOSPADM

## 2024-11-08 RX ORDER — BUPIVACAINE HCL/0.9 % NACL/PF 0.125 %
PLASTIC BAG, INJECTION (ML) EPIDURAL AS NEEDED
Status: DISCONTINUED | OUTPATIENT
Start: 2024-11-08 | End: 2024-11-08 | Stop reason: SURG

## 2024-11-08 RX ORDER — OXYCODONE AND ACETAMINOPHEN 5; 325 MG/1; MG/1
1 TABLET ORAL EVERY 4 HOURS PRN
Status: DISCONTINUED | OUTPATIENT
Start: 2024-11-08 | End: 2024-11-08 | Stop reason: HOSPADM

## 2024-11-08 RX ORDER — MIDAZOLAM HYDROCHLORIDE 1 MG/ML
INJECTION, SOLUTION INTRAMUSCULAR; INTRAVENOUS AS NEEDED
Status: DISCONTINUED | OUTPATIENT
Start: 2024-11-08 | End: 2024-11-08 | Stop reason: SURG

## 2024-11-08 RX ORDER — ALUMINA, MAGNESIA, AND SIMETHICONE 2400; 2400; 240 MG/30ML; MG/30ML; MG/30ML
15 SUSPENSION ORAL EVERY 4 HOURS PRN
Status: DISCONTINUED | OUTPATIENT
Start: 2024-11-08 | End: 2024-11-10 | Stop reason: HOSPADM

## 2024-11-08 RX ORDER — BUPIVACAINE HYDROCHLORIDE 7.5 MG/ML
INJECTION, SOLUTION INTRASPINAL
Status: COMPLETED | OUTPATIENT
Start: 2024-11-08 | End: 2024-11-08

## 2024-11-08 RX ORDER — CARBOPROST TROMETHAMINE 250 UG/ML
250 INJECTION, SOLUTION INTRAMUSCULAR AS NEEDED
Status: DISCONTINUED | OUTPATIENT
Start: 2024-11-08 | End: 2024-11-08 | Stop reason: HOSPADM

## 2024-11-08 RX ORDER — OXYCODONE HYDROCHLORIDE 10 MG/1
10 TABLET ORAL EVERY 4 HOURS PRN
Status: DISCONTINUED | OUTPATIENT
Start: 2024-11-08 | End: 2024-11-10 | Stop reason: HOSPADM

## 2024-11-08 RX ORDER — SIMETHICONE 80 MG
80 TABLET,CHEWABLE ORAL 4 TIMES DAILY PRN
Status: DISCONTINUED | OUTPATIENT
Start: 2024-11-08 | End: 2024-11-10 | Stop reason: HOSPADM

## 2024-11-08 RX ORDER — PROMETHAZINE HYDROCHLORIDE 12.5 MG/1
12.5 TABLET ORAL EVERY 4 HOURS PRN
Status: DISCONTINUED | OUTPATIENT
Start: 2024-11-08 | End: 2024-11-10 | Stop reason: HOSPADM

## 2024-11-08 RX ORDER — ONDANSETRON 4 MG/1
4 TABLET, ORALLY DISINTEGRATING ORAL EVERY 6 HOURS PRN
Status: DISCONTINUED | OUTPATIENT
Start: 2024-11-08 | End: 2024-11-10 | Stop reason: HOSPADM

## 2024-11-08 RX ORDER — ACETAMINOPHEN 325 MG/1
650 TABLET ORAL EVERY 6 HOURS
Status: DISCONTINUED | OUTPATIENT
Start: 2024-11-09 | End: 2024-11-10 | Stop reason: HOSPADM

## 2024-11-08 RX ORDER — METHYLERGONOVINE MALEATE 0.2 MG/ML
INJECTION INTRAVENOUS
Status: COMPLETED
Start: 2024-11-08 | End: 2024-11-08

## 2024-11-08 RX ORDER — FENTANYL CITRATE 50 UG/ML
INJECTION, SOLUTION INTRAMUSCULAR; INTRAVENOUS AS NEEDED
Status: DISCONTINUED | OUTPATIENT
Start: 2024-11-08 | End: 2024-11-08 | Stop reason: SURG

## 2024-11-08 RX ORDER — KETOROLAC TROMETHAMINE 15 MG/ML
15 INJECTION, SOLUTION INTRAMUSCULAR; INTRAVENOUS EVERY 6 HOURS
Status: COMPLETED | OUTPATIENT
Start: 2024-11-09 | End: 2024-11-09

## 2024-11-08 RX ORDER — MISOPROSTOL 200 UG/1
800 TABLET ORAL AS NEEDED
Status: DISCONTINUED | OUTPATIENT
Start: 2024-11-08 | End: 2024-11-08 | Stop reason: HOSPADM

## 2024-11-08 RX ORDER — EPHEDRINE SULFATE 5 MG/ML
INJECTION INTRAVENOUS AS NEEDED
Status: DISCONTINUED | OUTPATIENT
Start: 2024-11-08 | End: 2024-11-08 | Stop reason: SURG

## 2024-11-08 RX ORDER — ONDANSETRON 2 MG/ML
4 INJECTION INTRAMUSCULAR; INTRAVENOUS EVERY 6 HOURS PRN
Status: DISCONTINUED | OUTPATIENT
Start: 2024-11-08 | End: 2024-11-08 | Stop reason: HOSPADM

## 2024-11-08 RX ORDER — HYDROXYZINE HYDROCHLORIDE 25 MG/1
50 TABLET, FILM COATED ORAL EVERY 6 HOURS PRN
Status: DISCONTINUED | OUTPATIENT
Start: 2024-11-08 | End: 2024-11-10 | Stop reason: HOSPADM

## 2024-11-08 RX ORDER — FAMOTIDINE 10 MG/ML
INJECTION, SOLUTION INTRAVENOUS AS NEEDED
Status: DISCONTINUED | OUTPATIENT
Start: 2024-11-08 | End: 2024-11-08 | Stop reason: SURG

## 2024-11-08 RX ORDER — SODIUM CHLORIDE, SODIUM LACTATE, POTASSIUM CHLORIDE, CALCIUM CHLORIDE 600; 310; 30; 20 MG/100ML; MG/100ML; MG/100ML; MG/100ML
75 INJECTION, SOLUTION INTRAVENOUS CONTINUOUS
Status: ACTIVE | OUTPATIENT
Start: 2024-11-08 | End: 2024-11-09

## 2024-11-08 RX ORDER — METOCLOPRAMIDE HYDROCHLORIDE 5 MG/ML
INJECTION INTRAMUSCULAR; INTRAVENOUS AS NEEDED
Status: DISCONTINUED | OUTPATIENT
Start: 2024-11-08 | End: 2024-11-08 | Stop reason: SURG

## 2024-11-08 RX ORDER — SODIUM CHLORIDE 0.9 % (FLUSH) 0.9 %
3-10 SYRINGE (ML) INJECTION AS NEEDED
Status: DISCONTINUED | OUTPATIENT
Start: 2024-11-08 | End: 2024-11-10 | Stop reason: HOSPADM

## 2024-11-08 RX ORDER — NALOXONE HCL 0.4 MG/ML
0.4 VIAL (ML) INJECTION
Status: ACTIVE | OUTPATIENT
Start: 2024-11-08 | End: 2024-11-09

## 2024-11-08 RX ORDER — MISOPROSTOL 200 UG/1
800 TABLET ORAL AS NEEDED
Status: DISCONTINUED | OUTPATIENT
Start: 2024-11-08 | End: 2024-11-10 | Stop reason: HOSPADM

## 2024-11-08 RX ORDER — OXYCODONE HYDROCHLORIDE 5 MG/1
5 TABLET ORAL EVERY 4 HOURS PRN
Status: DISCONTINUED | OUTPATIENT
Start: 2024-11-08 | End: 2024-11-10 | Stop reason: HOSPADM

## 2024-11-08 RX ORDER — OXYTOCIN/0.9 % SODIUM CHLORIDE 30/500 ML
85 PLASTIC BAG, INJECTION (ML) INTRAVENOUS ONCE
Status: DISCONTINUED | OUTPATIENT
Start: 2024-11-08 | End: 2024-11-08 | Stop reason: HOSPADM

## 2024-11-08 RX ORDER — OXYTOCIN/0.9 % SODIUM CHLORIDE 30/500 ML
650 PLASTIC BAG, INJECTION (ML) INTRAVENOUS ONCE
Status: DISCONTINUED | OUTPATIENT
Start: 2024-11-08 | End: 2024-11-08 | Stop reason: HOSPADM

## 2024-11-08 RX ORDER — ONDANSETRON 4 MG/1
4 TABLET, ORALLY DISINTEGRATING ORAL EVERY 6 HOURS PRN
Status: DISCONTINUED | OUTPATIENT
Start: 2024-11-08 | End: 2024-11-08 | Stop reason: HOSPADM

## 2024-11-08 RX ORDER — METHYLERGONOVINE MALEATE 0.2 MG/ML
INJECTION INTRAVENOUS AS NEEDED
Status: DISCONTINUED | OUTPATIENT
Start: 2024-11-08 | End: 2024-11-08 | Stop reason: SURG

## 2024-11-08 RX ORDER — KETOROLAC TROMETHAMINE 30 MG/ML
30 INJECTION, SOLUTION INTRAMUSCULAR; INTRAVENOUS ONCE
Status: COMPLETED | OUTPATIENT
Start: 2024-11-08 | End: 2024-11-08

## 2024-11-08 RX ORDER — SODIUM CHLORIDE 0.9 % (FLUSH) 0.9 %
10 SYRINGE (ML) INJECTION EVERY 12 HOURS SCHEDULED
Status: DISCONTINUED | OUTPATIENT
Start: 2024-11-08 | End: 2024-11-10 | Stop reason: HOSPADM

## 2024-11-08 RX ORDER — SODIUM CHLORIDE 0.9 % (FLUSH) 0.9 %
10 SYRINGE (ML) INJECTION AS NEEDED
Status: DISCONTINUED | OUTPATIENT
Start: 2024-11-08 | End: 2024-11-10 | Stop reason: HOSPADM

## 2024-11-08 RX ORDER — ACETAMINOPHEN 500 MG
1000 TABLET ORAL ONCE
Status: COMPLETED | OUTPATIENT
Start: 2024-11-08 | End: 2024-11-08

## 2024-11-08 RX ORDER — ACETAMINOPHEN 500 MG
1000 TABLET ORAL EVERY 6 HOURS
Status: COMPLETED | OUTPATIENT
Start: 2024-11-08 | End: 2024-11-09

## 2024-11-08 RX ORDER — OXYTOCIN/0.9 % SODIUM CHLORIDE 30/500 ML
PLASTIC BAG, INJECTION (ML) INTRAVENOUS AS NEEDED
Status: DISCONTINUED | OUTPATIENT
Start: 2024-11-08 | End: 2024-11-08 | Stop reason: SURG

## 2024-11-08 RX ORDER — LIDOCAINE HYDROCHLORIDE 10 MG/ML
0.5 INJECTION, SOLUTION EPIDURAL; INFILTRATION; INTRACAUDAL; PERINEURAL ONCE AS NEEDED
Status: DISCONTINUED | OUTPATIENT
Start: 2024-11-08 | End: 2024-11-10 | Stop reason: HOSPADM

## 2024-11-08 RX ORDER — SODIUM CHLORIDE 9 MG/ML
40 INJECTION, SOLUTION INTRAVENOUS AS NEEDED
Status: DISCONTINUED | OUTPATIENT
Start: 2024-11-08 | End: 2024-11-10 | Stop reason: HOSPADM

## 2024-11-08 RX ORDER — DIPHENHYDRAMINE HCL 25 MG
25 CAPSULE ORAL EVERY 4 HOURS PRN
Status: CANCELLED | OUTPATIENT
Start: 2024-11-08

## 2024-11-08 RX ORDER — PROMETHAZINE HYDROCHLORIDE 12.5 MG/1
12.5 SUPPOSITORY RECTAL EVERY 6 HOURS PRN
Status: DISCONTINUED | OUTPATIENT
Start: 2024-11-08 | End: 2024-11-10 | Stop reason: HOSPADM

## 2024-11-08 RX ORDER — HYDROCORTISONE 25 MG/G
1 CREAM TOPICAL AS NEEDED
Status: DISCONTINUED | OUTPATIENT
Start: 2024-11-08 | End: 2024-11-10 | Stop reason: HOSPADM

## 2024-11-08 RX ORDER — SODIUM CHLORIDE 0.9 % (FLUSH) 0.9 %
3 SYRINGE (ML) INJECTION EVERY 12 HOURS SCHEDULED
Status: DISCONTINUED | OUTPATIENT
Start: 2024-11-08 | End: 2024-11-10 | Stop reason: HOSPADM

## 2024-11-08 RX ORDER — CALCIUM CARBONATE 500 MG/1
1 TABLET, CHEWABLE ORAL EVERY 4 HOURS PRN
Status: DISCONTINUED | OUTPATIENT
Start: 2024-11-08 | End: 2024-11-10 | Stop reason: HOSPADM

## 2024-11-08 RX ORDER — ONDANSETRON 2 MG/ML
4 INJECTION INTRAMUSCULAR; INTRAVENOUS ONCE AS NEEDED
Status: ACTIVE | OUTPATIENT
Start: 2024-11-08 | End: 2024-11-09

## 2024-11-08 RX ORDER — CARBOPROST TROMETHAMINE 250 UG/ML
250 INJECTION, SOLUTION INTRAMUSCULAR AS NEEDED
Status: DISCONTINUED | OUTPATIENT
Start: 2024-11-08 | End: 2024-11-10 | Stop reason: HOSPADM

## 2024-11-08 RX ORDER — DOCUSATE SODIUM 100 MG/1
100 CAPSULE, LIQUID FILLED ORAL 2 TIMES DAILY PRN
Status: DISCONTINUED | OUTPATIENT
Start: 2024-11-08 | End: 2024-11-10 | Stop reason: HOSPADM

## 2024-11-08 RX ORDER — IBUPROFEN 600 MG/1
600 TABLET, FILM COATED ORAL EVERY 6 HOURS
Status: DISCONTINUED | OUTPATIENT
Start: 2024-11-10 | End: 2024-11-10 | Stop reason: HOSPADM

## 2024-11-08 RX ORDER — ONDANSETRON 2 MG/ML
4 INJECTION INTRAMUSCULAR; INTRAVENOUS EVERY 6 HOURS PRN
Status: DISCONTINUED | OUTPATIENT
Start: 2024-11-08 | End: 2024-11-10 | Stop reason: HOSPADM

## 2024-11-08 RX ORDER — DIPHENHYDRAMINE HYDROCHLORIDE 50 MG/ML
25 INJECTION INTRAMUSCULAR; INTRAVENOUS EVERY 4 HOURS PRN
Status: CANCELLED | OUTPATIENT
Start: 2024-11-08

## 2024-11-08 RX ORDER — MORPHINE SULFATE 0.5 MG/ML
INJECTION, SOLUTION EPIDURAL; INTRATHECAL; INTRAVENOUS AS NEEDED
Status: DISCONTINUED | OUTPATIENT
Start: 2024-11-08 | End: 2024-11-08 | Stop reason: SURG

## 2024-11-08 RX ORDER — OXYTOCIN/0.9 % SODIUM CHLORIDE 30/500 ML
125 PLASTIC BAG, INJECTION (ML) INTRAVENOUS ONCE AS NEEDED
Status: DISCONTINUED | OUTPATIENT
Start: 2024-11-08 | End: 2024-11-10 | Stop reason: HOSPADM

## 2024-11-08 RX ORDER — ENOXAPARIN SODIUM 100 MG/ML
40 INJECTION SUBCUTANEOUS EVERY 12 HOURS
Status: DISCONTINUED | OUTPATIENT
Start: 2024-11-09 | End: 2024-11-10 | Stop reason: HOSPADM

## 2024-11-08 RX ORDER — METHYLERGONOVINE MALEATE 0.2 MG/ML
200 INJECTION INTRAVENOUS ONCE AS NEEDED
Status: DISCONTINUED | OUTPATIENT
Start: 2024-11-08 | End: 2024-11-08 | Stop reason: HOSPADM

## 2024-11-08 RX ORDER — OXYTOCIN 10 [USP'U]/ML
INJECTION, SOLUTION INTRAMUSCULAR; INTRAVENOUS AS NEEDED
Status: DISCONTINUED | OUTPATIENT
Start: 2024-11-08 | End: 2024-11-08 | Stop reason: SURG

## 2024-11-08 RX ORDER — PROMETHAZINE HYDROCHLORIDE 25 MG/1
25 TABLET ORAL EVERY 6 HOURS PRN
Status: DISCONTINUED | OUTPATIENT
Start: 2024-11-08 | End: 2024-11-10 | Stop reason: HOSPADM

## 2024-11-08 RX ORDER — CITRIC ACID/SODIUM CITRATE 334-500MG
30 SOLUTION, ORAL ORAL ONCE
Status: COMPLETED | OUTPATIENT
Start: 2024-11-08 | End: 2024-11-08

## 2024-11-08 RX ORDER — PROMETHAZINE HYDROCHLORIDE 12.5 MG/1
12.5 TABLET ORAL EVERY 6 HOURS PRN
Status: DISCONTINUED | OUTPATIENT
Start: 2024-11-08 | End: 2024-11-08 | Stop reason: HOSPADM

## 2024-11-08 RX ORDER — ONDANSETRON 2 MG/ML
INJECTION INTRAMUSCULAR; INTRAVENOUS AS NEEDED
Status: DISCONTINUED | OUTPATIENT
Start: 2024-11-08 | End: 2024-11-08 | Stop reason: SURG

## 2024-11-08 RX ADMIN — KETOROLAC TROMETHAMINE 30 MG: 30 INJECTION, SOLUTION INTRAMUSCULAR; INTRAVENOUS at 18:32

## 2024-11-08 RX ADMIN — Medication 200 MCG: at 16:10

## 2024-11-08 RX ADMIN — ONDANSETRON 4 MG: 2 INJECTION INTRAMUSCULAR; INTRAVENOUS at 15:50

## 2024-11-08 RX ADMIN — SODIUM CHLORIDE, POTASSIUM CHLORIDE, SODIUM LACTATE AND CALCIUM CHLORIDE 75 ML/HR: 600; 310; 30; 20 INJECTION, SOLUTION INTRAVENOUS at 18:30

## 2024-11-08 RX ADMIN — ACETAMINOPHEN 1000 MG: 500 TABLET ORAL at 14:45

## 2024-11-08 RX ADMIN — EPHEDRINE SULFATE 20 MG: 5 INJECTION INTRAVENOUS at 16:27

## 2024-11-08 RX ADMIN — SODIUM CHLORIDE, POTASSIUM CHLORIDE, SODIUM LACTATE AND CALCIUM CHLORIDE 1000 ML: 600; 310; 30; 20 INJECTION, SOLUTION INTRAVENOUS at 15:38

## 2024-11-08 RX ADMIN — OXYTOCIN 3 UNITS: 10 INJECTION INTRAVENOUS at 16:17

## 2024-11-08 RX ADMIN — OXYTOCIN 3 UNITS: 10 INJECTION INTRAVENOUS at 16:23

## 2024-11-08 RX ADMIN — METOCLOPRAMIDE 10 MG: 5 INJECTION, SOLUTION INTRAMUSCULAR; INTRAVENOUS at 15:50

## 2024-11-08 RX ADMIN — Medication 500 ML: at 16:17

## 2024-11-08 RX ADMIN — MORPHINE SULFATE 0.15 MG: 0.5 INJECTION, SOLUTION EPIDURAL; INTRATHECAL; INTRAVENOUS at 15:58

## 2024-11-08 RX ADMIN — SODIUM CHLORIDE, POTASSIUM CHLORIDE, SODIUM LACTATE AND CALCIUM CHLORIDE: 600; 310; 30; 20 INJECTION, SOLUTION INTRAVENOUS at 15:48

## 2024-11-08 RX ADMIN — EPHEDRINE SULFATE 15 MG: 5 INJECTION INTRAVENOUS at 16:08

## 2024-11-08 RX ADMIN — BUPIVACAINE HYDROCHLORIDE IN DEXTROSE 1.5 ML: 7.5 INJECTION, SOLUTION SUBARACHNOID at 15:58

## 2024-11-08 RX ADMIN — METHYLERGONOVINE MALEATE 200 MCG: 0.2 INJECTION, SOLUTION INTRAMUSCULAR; INTRAVENOUS at 16:17

## 2024-11-08 RX ADMIN — MIDAZOLAM HYDROCHLORIDE 1 MG: 1 INJECTION, SOLUTION INTRAMUSCULAR; INTRAVENOUS at 15:50

## 2024-11-08 RX ADMIN — Medication 175 MCG: at 16:03

## 2024-11-08 RX ADMIN — MIDAZOLAM HYDROCHLORIDE 1 MG: 1 INJECTION, SOLUTION INTRAMUSCULAR; INTRAVENOUS at 16:27

## 2024-11-08 RX ADMIN — OXYCODONE HYDROCHLORIDE 5 MG: 5 TABLET ORAL at 22:13

## 2024-11-08 RX ADMIN — FAMOTIDINE 20 MG: 10 INJECTION, SOLUTION INTRAVENOUS at 15:50

## 2024-11-08 RX ADMIN — SODIUM CHLORIDE 3 G: 900 INJECTION INTRAVENOUS at 15:37

## 2024-11-08 RX ADMIN — MIDAZOLAM HYDROCHLORIDE 1 MG: 1 INJECTION, SOLUTION INTRAMUSCULAR; INTRAVENOUS at 16:16

## 2024-11-08 RX ADMIN — FENTANYL CITRATE 20 MCG: 50 INJECTION, SOLUTION INTRAMUSCULAR; INTRAVENOUS at 15:58

## 2024-11-08 RX ADMIN — SODIUM CHLORIDE, POTASSIUM CHLORIDE, SODIUM LACTATE AND CALCIUM CHLORIDE 75 ML/HR: 600; 310; 30; 20 INJECTION, SOLUTION INTRAVENOUS at 14:37

## 2024-11-08 RX ADMIN — ACETAMINOPHEN 1000 MG: 500 TABLET ORAL at 20:22

## 2024-11-08 RX ADMIN — DOCUSATE SODIUM 100 MG: 100 CAPSULE, LIQUID FILLED ORAL at 20:22

## 2024-11-08 RX ADMIN — OXYTOCIN 4 UNITS: 10 INJECTION INTRAVENOUS at 16:28

## 2024-11-08 RX ADMIN — SODIUM CITRATE AND CITRIC ACID MONOHYDRATE 30 ML: 500; 334 SOLUTION ORAL at 15:41

## 2024-11-08 RX ADMIN — SIMETHICONE 80 MG: 80 TABLET, CHEWABLE ORAL at 20:22

## 2024-11-08 NOTE — OP NOTE
"[facility]   Section Operative Note    Pre-Operative Dx:   1.  IUP at Gestational Age: 39w0d weeks    2. Previous CD x 2        Postoperative dx:    1.  Same     Procedure: Procedure(s):   SECTION REPEAT   Surgeon/Assistant: Surgeon(s):  Savi Rosa MD         Anesthesia:  Anesthesiologist: Spinal  Anesthesiologist: Noelle Gilliam DO         QBL:  266 mL        IV Fluids: 1000 mls.   UOP: 50 mls.    I/O this shift:  In: 1000 [I.V.:1000]  Out: 316 [Urine:50; Blood:266]   Antibiotics: cefazolin (Ancef)     Infant:           Gender: male infant    Weight: 3461 g (7 lb 10.1 oz)    Apgars: 8  @ 1 minute /     9  @ 5 minutes    Cord gases: Venous:  No results found for: \"PHCVEN\", \"BECVEN\"     Arterial:  No results found for: \"PHCART\", \"BECART\"     Indication for C/Section:  Prior C/S          Priority for C/Section:  routine      Procedure Details:   The patient was taken to the operating room after risks and benefits have been reviewed. The consent was reviewed and had been signed. Time Out was performed. She was prepped and draped in the normal sterile fashion in the dorsal supine position and a leftward tilt.  A Pfannenstiel skin incision made 2 cm above the pubic symphysis and carried down to the underlying layer of fascia with the scalpel.  The fascia was nicked in the midline and the incision was extended laterally with Abbott scissors.  The anterior aspect of the incision was grasped with Kocher clamps x2 and elevated and the underlying rectus muscles were dissected off sharply and bluntly.  The inferior aspect of the incision was treated similarly.  The peritoneum was identified and entered bluntly the incision was extended with lateral traction.  An Hubert retractor was inserted.  The lower uterine segment was identified and incised in a transverse fashion with the scalpel.  The uterine cavity was entered bluntly and the incision was extended with cephalocaudad traction.  The " fetus was then delivered atraumatically.  Cord was clamped and cut after 60 second delay and the infant was handed off to DRT staff for evaluation.  Cord blood and cord gases were obtained.  The placenta was delivered via uterine massage.  The uterus was then exteriorized and cleared of all clot and debris with clean laparotomy sponges.  The hysterotomy was closed in a single layer with a #1 chromic in a running locking fashion.  Hemostasis was noted.  The posterior cul-de-sac was irrigated and aspirated.  The hysterotomy was again inspected and noted to be hemostatic.  The uterus was returned to the abdomen.  The paracolic gutters were irrigated and aspirated.  Hysterotomy was inspected for third time and noted to be hemostatic.  The peritoneum was closed with 2-0 chromic in a running fashion.  The rectus muscles underlying the fascia were made hemostatic with Bovie electrocautery.  The fascia was then closed with 1 Vicryl in a running stitch.  The subcutaneous tissues were irrigated aspirated and made hemostatic with Bovie electrocautery.  The subcutaneous tissues were reapproximated with 3-0 Vicryl and the skin was closed with 4-0 Monocryl in a subcuticular fashion and sealed with Dermabond.  All counts were correct and the patient was taken to the recovery room in stable condition.         Complications:   None     Specimens: none     Disposition:   Mother to Mother Baby/Postpartum  in stable condition currently.   Baby to NBN  in stable condition currently.       Savi Rosa MD  11/8/2024  16:37 EST

## 2024-11-08 NOTE — H&P
ADELITA Zavala  Obstetric History and Physical    CC: RCD    SUBJECTIVE:     Patient is a 29 y.o. female  currently at 39w0d, who presents with pregnancy c/b prior CD x2 for scheduled RCD. No acute complaints today. .      Prenatal Information:  Prenatal Results       Initial Prenatal Labs       Test Value Reference Range Date Time    Hemoglobin        Hematocrit        Platelets        Rubella IgG        Hepatitis B SAg        Hepatitis C Ab        RPR        T. Pallidum Ab   Non-Reactive  Non-Reactive 24 0854       Non-Reactive  Non-Reactive 24 1020    ABO  A   24 0854    Rh  Negative   24 0854    Antibody Screen        HIV        Urine Culture        Gonorrhea        Chlamydia        TSH        HgB A1c         Varicella IgG        Hemoglobinopathy Fractionation        Hemoglobinopathy (genetic testing)        Cystic fibrosis                   Fetal testing        Test Value Reference Range Date Time    NIPT        MSAFP        AFP-4                  2nd and 3rd Trimester       Test Value Reference Range Date Time    Hemoglobin (repeated)  10.4 g/dL 12.0 - 15.9 24 0854       10.3 g/dL 12.0 - 15.9 24 1020    Hematocrit (repeated)  32.8 % 34.0 - 46.6 24 0854       31.5 % 34.0 - 46.6 24 1020    Platelets   273 10*3/mm3 140 - 450 24 0854       273 10*3/mm3 140 - 450 24 1020    1 hour GTT   120 mg/dL 65 - 139 24 1020    Antibody Screen (repeated)  Negative   24 0854       Negative   24 1020    3rd TM syphilis scrn (repeated)  RPR         3rd TM syphilis scrn (repeated) TP-Ab  Non-Reactive  Non-Reactive 24 0854       Non-Reactive  Non-Reactive 24 1020    3rd TM syphilis screen TB-Ab (FTA)  Non-Reactive  Non-Reactive 24 0854       Non-Reactive  Non-Reactive 24 1020    Syphilis cascade test TP-Ab (EIA)        Syphilis cascade TPPA        GTT Fasting        GTT 1 Hr        GTT 2 Hr        GTT 3 Hr        Group B Strep                   Other testing        Test Value Reference Range Date Time    Parvo IgG         CMV IgG                   Drug Screening       Test Value Reference Range Date Time    Amphetamine Screen        Barbiturate Screen        Benzodiazepine Screen        Methadone Screen        Phencyclidine Screen        Opiates Screen        THC Screen        Cocaine Screen        Propoxyphene Screen        Buprenorphine Screen        Methamphetamine Screen        Oxycodone Screen        Tricyclic Antidepressants Screen                  Legend    ^: Historical                          External Prenatal Results       Pregnancy Outside Results - Transcribed From Office Records - See Scanned Records For Details       Test Value Date Time    ABO  A  11/06/24 0854    Rh  Negative  11/06/24 0854    Antibody Screen  Negative  11/06/24 0854       Negative  08/21/24 1020    Varicella IgG       Rubella       Hgb  10.4 g/dL 11/06/24 0854       10.3 g/dL 08/21/24 1020    Hct  32.8 % 11/06/24 0854       31.5 % 08/21/24 1020    HgB A1c        1h GTT  120 mg/dL 08/21/24 1020    3h GTT Fasting       3h GTT 1 hour       3h GTT 2 hour       3h GTT 3 hour        Gonorrhea (discrete)       Chlamydia (discrete)       RPR       Syphils cascade: TP-Ab (FTA)  Non-Reactive  11/06/24 0854       Non-Reactive  08/21/24 1020    TP-Ab  Non-Reactive  11/06/24 0854       Non-Reactive  08/21/24 1020    TP-Ab (EIA)       TPPA       HBsAg       Herpes Simplex Virus PCR       Herpes Simplex VIrus Culture       HIV       Hep C RNA Quant PCR       Hep C Antibody       AFP       NIPT       Cystic Fibroisis        Group B Strep       GBS Susceptibility to Clindamycin       GBS Susceptibility to Erythromycin       Fetal Fibronectin       Genetic Testing, Maternal Blood                 Drug Screening       Test Value Date Time    Urine Drug Screen       Amphetamine Screen       Barbiturate Screen       Benzodiazepine Screen       Methadone Screen        Phencyclidine Screen       Opiates Screen       THC Screen       Cocaine Screen       Propoxyphene Screen       Buprenorphine Screen       Methamphetamine Screen       Oxycodone Screen       Tricyclic Antidepressants Screen                 Legend    ^: Historical                             OB History:                     OB History    Para Term  AB Living   7 2 2 0 4 2   SAB IAB Ectopic Molar Multiple Live Births   3 0 0 0 0 2      # Outcome Date GA Lbr Jose/2nd Weight Sex Type Anes PTL Lv   7 Current            6 Term 22 39w1d  4103 g (9 lb 0.7 oz) M CS-LTranv Spinal N LUIS FERNANDO      Name: TAYO KING      Apgar1: 8  Apgar5: 9   5 Term 21 40w1d  3544 g (7 lb 13 oz) M  EPI N LUIS FERNANDO      Complications: Failure to Progress in Second Stage      Name: Jared Pascual SAB 20           3 SAB 20           2 SAB 19           1 AB               Allergies:                      No Known Allergies   Past Medical History: Past Medical History:   Diagnosis Date    Rh incompatibility 2024    Urinary tract infection     not frequent before       Past Surgical History Past Surgical History:   Procedure Laterality Date     SECTION N/A 2022    Procedure:  SECTION REPEAT;  Surgeon: Savi Rosa MD;  Location: [facility] LABOR DELIVERY;  Service: Obstetrics/Gynecology;  Laterality: N/A;     SECTION      CHOLECYSTECTOMY      VEIN LIGATION        Family History: Family History   Problem Relation Age of Onset    Hypertension Father     Colon cancer Paternal Grandfather     Prostate cancer Paternal Grandfather     Clotting disorder Paternal Grandmother     Diabetes Maternal Grandfather     Heart disease Maternal Grandfather     Breast cancer Paternal Aunt       Social History:  reports that she quit smoking about 9 months ago. Her smoking use included cigarettes. She has never used smokeless tobacco.   reports that she does not currently use alcohol.    reports no history of drug use.    General ROS: Pertinent items are noted in HPI, all other systems reviewed and negative   Medications: Vitafol Gummies, acetaminophen, and ferrous sulfate       Objective       Vital Signs Range for the last 24 hours  Temperature: Temp:  [98 °F (36.7 °C)] 98 °F (36.7 °C)   BP: BP: (103)/(68) 103/68   Pulse: Heart Rate:  [97] 97   Respirations: Resp:  [16] 16   SPO2: SpO2:  [98 %] 98 %               Physical Examination: General appearance - alert, well appearing, and in no distress  Chest - clear to auscultation, no wheezes, rales or rhonchi, symmetric air entry  Heart - normal rate, regular rhythm, normal S1, S2, no murmurs, rubs, clicks or gallops  Abdomen - Soft, gravid, nontender  Extremities - pedal edema tr +        Fetal Heart Rate Assessment           Baseline:  normal   Variability:  moderate   Accels:  present   Decels:  absent   Tracing Category:       Uterine Assessment   Method: Method: per patient report   Frequency (min):     Ctx Count in 10 min:       Laboratory Results:  CBC:      Lab 11/06/24  0854   WBC 12.26*   HEMOGLOBIN 10.4*   HEMATOCRIT 32.8*   PLATELETS 273   MCV 83.2              Assessment/Plan:   IUP 39w0d with prior CD x2    1.Admit and proceed with SIRIA  -Ancef, Bicitra    2.FWB reassuring    3.RH neg: hari Rosa MD  11/8/2024  15:22 EST

## 2024-11-08 NOTE — ANESTHESIA POSTPROCEDURE EVALUATION
Patient: Noelle Astudillo    Procedure Summary       Date: 24 Room / Location: [facility] LABOR DELIVERY   [facility] LABOR DELIVERY    Anesthesia Start: 154 Anesthesia Stop:     Procedure:  SECTION REPEAT (Abdomen) Diagnosis:     Surgeons: Savi Rosa MD Provider: Noelle Gilliam DO    Anesthesia Type: spinal, ITN ASA Status: 3            Anesthesia Type: spinal, ITN    Vitals  Vitals Value Taken Time   BP 90/50    Temp 97.8    Pulse 103 24 1638   Resp 15    SpO2 98 % 24 1638   Vitals shown include unfiled device data.        Post Anesthesia Care and Evaluation    Patient location during evaluation: bedside  Patient participation: complete - patient participated  Level of consciousness: awake and awake and alert  Pain score: 0  Pain management: satisfactory to patient    Airway patency: patent  Anesthetic complications: No anesthetic complications  PONV Status: none  Cardiovascular status: acceptable, hemodynamically stable and stable  Respiratory status: acceptable  Hydration status: stable  Post Neuraxial Block status: No signs or symptoms of PDPH

## 2024-11-08 NOTE — ANESTHESIA PREPROCEDURE EVALUATION
Anesthesia Evaluation     Patient summary reviewed and Nursing notes reviewed   NPO Solid Status: > 6 hours  NPO Liquid Status: > 4 hours           Airway   Mallampati: II  TM distance: >3 FB  Neck ROM: full  No difficulty expected  Dental - normal exam     Pulmonary - negative pulmonary ROS   Cardiovascular - negative cardio ROS        Neuro/Psych- negative ROS  GI/Hepatic/Renal/Endo    (+) obesity, morbid obesity    Musculoskeletal (-) negative ROS    Abdominal    Substance History - negative use     OB/GYN    (+) Pregnant        Other - negative ROS                   Anesthesia Plan    ASA 3     spinal and ITN       Anesthetic plan, risks, benefits, and alternatives have been provided, discussed and informed consent has been obtained with: patient.    CODE STATUS:    Level Of Support Discussed With: Patient  Code Status (Patient has no pulse and is not breathing): CPR (Attempt to Resuscitate)  Medical Interventions (Patient has pulse or is breathing): Full Support

## 2024-11-08 NOTE — ANESTHESIA PROCEDURE NOTES
Spinal Block      Patient reassessed immediately prior to procedure    Patient location during procedure: OB  Indication:procedure for pain  Performed By  Anesthesiologist: Noelle Gilliam DO  Preanesthetic Checklist  Completed: patient identified, IV checked, risks and benefits discussed, surgical consent, monitors and equipment checked, pre-op evaluation and timeout performed  Spinal Block Prep:  Patient Position:sitting  Sterile Tech:cap, gloves, mask and sterile barriers  Prep:Chloraprep  Patient Monitoring:blood pressure monitoring and EKG    Spinal Block Procedure  Approach:midline  Guidance:palpation technique  Location:L3-L4  Needle Type:Minerva  Needle Gauge:25 G  Placement of Spinal needle event:cerebrospinal fluid aspirated  Paresthesia: no  Fluid Appearance:clear  Medications: bupivacaine in dextrose (MARCAINE SPINAL / Hyberbaric) 0.75-8.25 % injection - Intrathecal   1.5 mL - 11/8/2024 3:58:00 PM   Post Assessment  Patient Tolerance:patient tolerated the procedure well with no apparent complications  Complications no

## 2024-11-09 LAB
BASOPHILS # BLD AUTO: 0.05 10*3/MM3 (ref 0–0.2)
BASOPHILS NFR BLD AUTO: 0.5 % (ref 0–1.5)
DEPRECATED RDW RBC AUTO: 46 FL (ref 37–54)
EOSINOPHIL # BLD AUTO: 0.14 10*3/MM3 (ref 0–0.4)
EOSINOPHIL NFR BLD AUTO: 1.4 % (ref 0.3–6.2)
ERYTHROCYTE [DISTWIDTH] IN BLOOD BY AUTOMATED COUNT: 15.3 % (ref 12.3–15.4)
HCT VFR BLD AUTO: 25.1 % (ref 34–46.6)
HGB BLD-MCNC: 8 G/DL (ref 12–15.9)
IMM GRANULOCYTES # BLD AUTO: 0.03 10*3/MM3 (ref 0–0.05)
IMM GRANULOCYTES NFR BLD AUTO: 0.3 % (ref 0–0.5)
LYMPHOCYTES # BLD AUTO: 1.86 10*3/MM3 (ref 0.7–3.1)
LYMPHOCYTES NFR BLD AUTO: 18.1 % (ref 19.6–45.3)
MCH RBC QN AUTO: 26.4 PG (ref 26.6–33)
MCHC RBC AUTO-ENTMCNC: 31.9 G/DL (ref 31.5–35.7)
MCV RBC AUTO: 82.8 FL (ref 79–97)
MONOCYTES # BLD AUTO: 0.59 10*3/MM3 (ref 0.1–0.9)
MONOCYTES NFR BLD AUTO: 5.7 % (ref 5–12)
NEUTROPHILS NFR BLD AUTO: 7.62 10*3/MM3 (ref 1.7–7)
NEUTROPHILS NFR BLD AUTO: 74 % (ref 42.7–76)
NRBC BLD AUTO-RTO: 0 /100 WBC (ref 0–0.2)
PLATELET # BLD AUTO: 175 10*3/MM3 (ref 140–450)
PMV BLD AUTO: 10.1 FL (ref 6–12)
RBC # BLD AUTO: 3.03 10*6/MM3 (ref 3.77–5.28)
WBC NRBC COR # BLD AUTO: 10.29 10*3/MM3 (ref 3.4–10.8)

## 2024-11-09 PROCEDURE — 25810000003 LACTATED RINGERS PER 1000 ML: Performed by: OBSTETRICS & GYNECOLOGY

## 2024-11-09 PROCEDURE — 25810000003 LACTATED RINGERS SOLUTION: Performed by: OBSTETRICS & GYNECOLOGY

## 2024-11-09 PROCEDURE — 85025 COMPLETE CBC W/AUTO DIFF WBC: CPT | Performed by: OBSTETRICS & GYNECOLOGY

## 2024-11-09 PROCEDURE — 25010000002 KETOROLAC TROMETHAMINE PER 15 MG: Performed by: OBSTETRICS & GYNECOLOGY

## 2024-11-09 RX ORDER — FERROUS SULFATE 325(65) MG
325 TABLET ORAL
Status: DISCONTINUED | OUTPATIENT
Start: 2024-11-09 | End: 2024-11-10 | Stop reason: HOSPADM

## 2024-11-09 RX ADMIN — PRENATAL VITAMINS-IRON FUMARATE 27 MG IRON-FOLIC ACID 0.8 MG TABLET 1 TABLET: at 08:03

## 2024-11-09 RX ADMIN — FERROUS SULFATE TAB 325 MG (65 MG ELEMENTAL FE) 325 MG: 325 (65 FE) TAB at 09:31

## 2024-11-09 RX ADMIN — SODIUM CHLORIDE, POTASSIUM CHLORIDE, SODIUM LACTATE AND CALCIUM CHLORIDE 1000 ML: 600; 310; 30; 20 INJECTION, SOLUTION INTRAVENOUS at 01:29

## 2024-11-09 RX ADMIN — OXYCODONE HYDROCHLORIDE 10 MG: 10 TABLET ORAL at 22:20

## 2024-11-09 RX ADMIN — OXYCODONE HYDROCHLORIDE 10 MG: 10 TABLET ORAL at 16:21

## 2024-11-09 RX ADMIN — DOCUSATE SODIUM 100 MG: 100 CAPSULE, LIQUID FILLED ORAL at 08:03

## 2024-11-09 RX ADMIN — ACETAMINOPHEN 1000 MG: 500 TABLET ORAL at 14:52

## 2024-11-09 RX ADMIN — SIMETHICONE 80 MG: 80 TABLET, CHEWABLE ORAL at 08:03

## 2024-11-09 RX ADMIN — DOCUSATE SODIUM 100 MG: 100 CAPSULE, LIQUID FILLED ORAL at 21:11

## 2024-11-09 RX ADMIN — IBUPROFEN 600 MG: 600 TABLET, FILM COATED ORAL at 23:46

## 2024-11-09 RX ADMIN — SODIUM CHLORIDE, POTASSIUM CHLORIDE, SODIUM LACTATE AND CALCIUM CHLORIDE 75 ML/HR: 600; 310; 30; 20 INJECTION, SOLUTION INTRAVENOUS at 00:28

## 2024-11-09 RX ADMIN — ACETAMINOPHEN 1000 MG: 500 TABLET ORAL at 02:47

## 2024-11-09 RX ADMIN — KETOROLAC TROMETHAMINE 15 MG: 15 INJECTION, SOLUTION INTRAMUSCULAR; INTRAVENOUS at 12:15

## 2024-11-09 RX ADMIN — ACETAMINOPHEN 650 MG: 325 TABLET ORAL at 21:11

## 2024-11-09 RX ADMIN — ACETAMINOPHEN 1000 MG: 500 TABLET ORAL at 08:03

## 2024-11-09 RX ADMIN — KETOROLAC TROMETHAMINE 15 MG: 15 INJECTION, SOLUTION INTRAMUSCULAR; INTRAVENOUS at 00:28

## 2024-11-09 RX ADMIN — KETOROLAC TROMETHAMINE 15 MG: 15 INJECTION, SOLUTION INTRAMUSCULAR; INTRAVENOUS at 05:40

## 2024-11-09 RX ADMIN — SODIUM CHLORIDE, POTASSIUM CHLORIDE, SODIUM LACTATE AND CALCIUM CHLORIDE 1000 ML: 600; 310; 30; 20 INJECTION, SOLUTION INTRAVENOUS at 05:40

## 2024-11-09 RX ADMIN — KETOROLAC TROMETHAMINE 15 MG: 15 INJECTION, SOLUTION INTRAMUSCULAR; INTRAVENOUS at 18:20

## 2024-11-09 RX ADMIN — OXYCODONE HYDROCHLORIDE 10 MG: 10 TABLET ORAL at 05:52

## 2024-11-09 NOTE — LACTATION NOTE
[id number]   Maternal Information   Date of Referral 24   Person Making Referral lactation consultant  (courtesy visit, newly postpartum)   Maternal Reason for Referral breastfeeding currently   Infant Reason for Referral  infant   Maternal Assessment   Breast Size Issue none   Breast Shape Bilateral:;round   Breast Density Bilateral:;soft   Areola Bilateral:;elastic   Nipples Bilateral:;short;other (see comments)  (small nipple shield used)   Left Nipple Symptoms intact;nontender   Right Nipple Symptoms intact;nontender   Maternal Infant Feeding   Maternal Emotional State relaxed;receptive   Infant Positioning clutch/football  (left)   Signs of Milk Transfer audible swallow;deep jaw excursions noted   Pain with Feeding no   Latch Assistance minimal assistance   Support Person Involvement actively supporting mother   Milk Expression/Equipment   Breast Pump Type double electric, personal  (Lansinoh hands free and Motif Clover)   Lactation Referrals   Lactation Referrals outpatient lactation program   Outpatient Lactation Program Lactation Follow-up Date/Time prn after discharge     Courtesy visit to newly postpartum couplet. Mom reports breast feeding going well this far. She tried breast feeding #1-2 but didn't latch and lost weight and changed to formula. Assisted Mom with latch in left football. Demonstrated how to stack pillows to get baby up to height of breast and how to turn baby in belly-to-belly with Mom. Assisted with latch with verbal guidance and Mom is using a small nipple shield for short nipples. She denies pain with latch. Audible swallows noted. Infant nursed x 20 min. And still going when I left room. Reviewed breast feeding information and tips handouts with parents. They verbalized understanding. Mom has a Lansinoh wearable and a Motif pump. Encouraged to call lactation with any questions/concerns. Encouraged to call outpatient clinic prn after discharge.

## 2024-11-09 NOTE — CASE MANAGEMENT/SOCIAL WORK
Continued Stay Note  [facility]     Patient Name: Noelle Astudillo  MRN: [id number]  Today's Date: 11/9/2024    Admit Date: 11/8/2024    Plan: GILMA consult--home   Discharge Plan       Row Name 11/09/24 1143       Plan    Plan SW consult--home    Plan Comments MSW met with pt. at bedside. Pt. had an [place] score of 11. Pt. reports that she is feeling “okay”.  MSW provided resources and offered support. No other needs or concerns currently. MSW is available.    Final Discharge Disposition Code 01 - home or self-care                   Discharge Codes    No documentation.                       STUART Godinez

## 2024-11-09 NOTE — PAYOR COMM NOTE
"Notification of Delivery  ACC/Utilization Review  Phone: [phone]  Fax: [phone]    [address] [address]    Kenneth King (29 y.o. Female)       Date of Birth   1995    Social Security Number       Address   [address] [place] [id number]    Home Phone   [phone]    MRN   [id number]       Islam   Taoism    Marital Status                               Admission Date   11/8/24    Admission Type   Elective    Admitting Provider   Savi Rosa MD    Attending Provider   Savi Rosa MD    Department, Room/Bed   [facility] MOTHER BABY 4A, N408/1       Discharge Date       Discharge Disposition       Discharge Destination                                 Attending Provider: Savi Rosa MD    Allergies: No Known Allergies    Isolation: None   Infection: None   Code Status: CPR    Ht: 160 cm (63\")   Wt: 121 kg (267 lb)    Admission Cmt: None   Principal Problem: None                  Active Insurance as of 11/8/2024       Primary Coverage       Payor Plan Insurance Group Employer/Plan Group    ANTH MEDICAID [facility] MEDICAID [id number]       Payor Plan Address Payor Plan Phone Number Payor Plan Fax Number Effective Dates    [address] [address] [phone]  2/1/2024 - None Entered    [facility] [address]         Subscriber Name Subscriber Birth Date Member ID       KENNETH KING 1995 [id number]                     Emergency Contacts        (Rel.) Home Phone Work Phone Mobile Phone    meri king (Spouse) [phone] -- --              Insurance Information                  ANTHEM MEDICAID/[employer] [employer]ICAID Phone: [phone]    Subscriber: Kenneth King Krystle Subscriber#: [id number]    Group#: [id number] Precert#: --    Authorization#: -- Effective Date: --             History & Physical        Savi Rosa MD at 11/08/24 1522  "         ADELITA Zavala  Obstetric History and Physical    CC: RCD    SUBJECTIVE:     Patient is a 29 y.o. female  currently at 39w0d, who presents with pregnancy c/b prior CD x2 for scheduled RCD. No acute complaints today. .      Prenatal Information:  Prenatal Results       Initial Prenatal Labs       Test Value Reference Range Date Time    Hemoglobin        Hematocrit        Platelets        Rubella IgG        Hepatitis B SAg        Hepatitis C Ab        RPR        T. Pallidum Ab   Non-Reactive  Non-Reactive 24 0854       Non-Reactive  Non-Reactive 24 1020    ABO  A   24 0854    Rh  Negative   24 0854    Antibody Screen        HIV        Urine Culture        Gonorrhea        Chlamydia        TSH        HgB A1c         Varicella IgG        Hemoglobinopathy Fractionation        Hemoglobinopathy (genetic testing)        Cystic fibrosis                   Fetal testing        Test Value Reference Range Date Time    NIPT        MSAFP        AFP-4                  2nd and 3rd Trimester       Test Value Reference Range Date Time    Hemoglobin (repeated)  10.4 g/dL 12.0 - 15.9 24 0854       10.3 g/dL 12.0 - 15.9 24 1020    Hematocrit (repeated)  32.8 % 34.0 - 46.6 24 0854       31.5 % 34.0 - 46.6 24 1020    Platelets   273 10*3/mm3 140 - 450 24 0854       273 10*3/mm3 140 - 450 24 1020    1 hour GTT   120 mg/dL 65 - 139 24 1020    Antibody Screen (repeated)  Negative   24 0854       Negative   24 1020    3rd TM syphilis scrn (repeated)  RPR         3rd TM syphilis scrn (repeated) TP-Ab  Non-Reactive  Non-Reactive 24 0854       Non-Reactive  Non-Reactive 24 1020    3rd TM syphilis screen TB-Ab (FTA)  Non-Reactive  Non-Reactive 24 0854       Non-Reactive  Non-Reactive 24 1020    Syphilis cascade test TP-Ab (EIA)        Syphilis cascade TPPA        GTT Fasting        GTT 1 Hr        GTT 2 Hr        GTT 3 Hr        Group  B Strep                  Other testing        Test Value Reference Range Date Time    Parvo IgG         CMV IgG                   Drug Screening       Test Value Reference Range Date Time    Amphetamine Screen        Barbiturate Screen        Benzodiazepine Screen        Methadone Screen        Phencyclidine Screen        Opiates Screen        THC Screen        Cocaine Screen        Propoxyphene Screen        Buprenorphine Screen        Methamphetamine Screen        Oxycodone Screen        Tricyclic Antidepressants Screen                  Legend    ^: Historical                          External Prenatal Results       Pregnancy Outside Results - Transcribed From Office Records - See Scanned Records For Details       Test Value Date Time    ABO  A  11/06/24 0854    Rh  Negative  11/06/24 0854    Antibody Screen  Negative  11/06/24 0854       Negative  08/21/24 1020    Varicella IgG       Rubella       Hgb  10.4 g/dL 11/06/24 0854       10.3 g/dL 08/21/24 1020    Hct  32.8 % 11/06/24 0854       31.5 % 08/21/24 1020    HgB A1c        1h GTT  120 mg/dL 08/21/24 1020    3h GTT Fasting       3h GTT 1 hour       3h GTT 2 hour       3h GTT 3 hour        Gonorrhea (discrete)       Chlamydia (discrete)       RPR       Syphils cascade: TP-Ab (FTA)  Non-Reactive  11/06/24 0854       Non-Reactive  08/21/24 1020    TP-Ab  Non-Reactive  11/06/24 0854       Non-Reactive  08/21/24 1020    TP-Ab (EIA)       TPPA       HBsAg       Herpes Simplex Virus PCR       Herpes Simplex VIrus Culture       HIV       Hep C RNA Quant PCR       Hep C Antibody       AFP       NIPT       Cystic Fibroisis        Group B Strep       GBS Susceptibility to Clindamycin       GBS Susceptibility to Erythromycin       Fetal Fibronectin       Genetic Testing, Maternal Blood                 Drug Screening       Test Value Date Time    Urine Drug Screen       Amphetamine Screen       Barbiturate Screen       Benzodiazepine Screen       Methadone Screen        Phencyclidine Screen       Opiates Screen       THC Screen       Cocaine Screen       Propoxyphene Screen       Buprenorphine Screen       Methamphetamine Screen       Oxycodone Screen       Tricyclic Antidepressants Screen                 Legend    ^: Historical                             OB History:                     OB History    Para Term  AB Living   7 2 2 0 4 2   SAB IAB Ectopic Molar Multiple Live Births   3 0 0 0 0 2      # Outcome Date GA Lbr Jose/2nd Weight Sex Type Anes PTL Lv   7 Current            6 Term 22 39w1d  4103 g (9 lb 0.7 oz) M CS-LTranv Spinal N LUIS FERNANDO      Name: TAYO KING      Apgar1: 8  Apgar5: 9   5 Term 21 40w1d  3544 g (7 lb 13 oz) M  EPI N LUIS FERNANDO      Complications: Failure to Progress in Second Stage      Name: Jared Pascual SAB 20           3 SAB 20           2 SAB 19           1 AB               Allergies:                      No Known Allergies   Past Medical History: Past Medical History:   Diagnosis Date    Rh incompatibility 2024    Urinary tract infection     not frequent before       Past Surgical History Past Surgical History:   Procedure Laterality Date     SECTION N/A 2022    Procedure:  SECTION REPEAT;  Surgeon: Savi Rosa MD;  Location: [facility] LABOR DELIVERY;  Service: Obstetrics/Gynecology;  Laterality: N/A;     SECTION      CHOLECYSTECTOMY      VEIN LIGATION        Family History: Family History   Problem Relation Age of Onset    Hypertension Father     Colon cancer Paternal Grandfather     Prostate cancer Paternal Grandfather     Clotting disorder Paternal Grandmother     Diabetes Maternal Grandfather     Heart disease Maternal Grandfather     Breast cancer Paternal Aunt       Social History:  reports that she quit smoking about 9 months ago. Her smoking use included cigarettes. She has never used smokeless tobacco.   reports that she does not currently use alcohol.    reports no history of drug use.    General ROS: Pertinent items are noted in HPI, all other systems reviewed and negative   Medications: Vitafol Gummies, acetaminophen, and ferrous sulfate       Objective      Vital Signs Range for the last 24 hours  Temperature: Temp:  [98 °F (36.7 °C)] 98 °F (36.7 °C)   BP: BP: (103)/(68) 103/68   Pulse: Heart Rate:  [97] 97   Respirations: Resp:  [16] 16   SPO2: SpO2:  [98 %] 98 %               Physical Examination: General appearance - alert, well appearing, and in no distress  Chest - clear to auscultation, no wheezes, rales or rhonchi, symmetric air entry  Heart - normal rate, regular rhythm, normal S1, S2, no murmurs, rubs, clicks or gallops  Abdomen - Soft, gravid, nontender  Extremities - pedal edema tr +        Fetal Heart Rate Assessment           Baseline:  normal   Variability:  moderate   Accels:  present   Decels:  absent   Tracing Category:       Uterine Assessment   Method: Method: per patient report   Frequency (min):     Ctx Count in 10 min:       Laboratory Results:  CBC:      Lab 11/06/24  0854   WBC 12.26*   HEMOGLOBIN 10.4*   HEMATOCRIT 32.8*   PLATELETS 273   MCV 83.2              Assessment/Plan:   IUP 39w0d with prior CD x2    1.Admit and proceed with SIRIA  -Ancef, Bicitra    2.FWB reassuring    3.RH neg: rhogam prn      Savi Rosa MD  11/8/2024  15:22 EST      Electronically signed by Savi Rosa MD at 11/08/24 1523       H&P signed by New Onbase, [place] at 11/07/24 1545         [Media Unavailable] Scan on 11/7/2024 1544 by New On[employer], [place]: Prenatal Records          Electronically signed by New Onbase, [place] at 11/07/24 1545       Prior to Admission Medications       Prescriptions Last Dose Informant Patient Reported? Taking?    Prenatal Vit-Fe Phos-FA-Omega (Vitafol Gummies) 3.33-0.333-34.8 MG chewable tablet 11/7/2024 Self No Yes    Chew 2 tablets Daily.    acetaminophen (TYLENOL) 325 MG tablet  Self No No    Take 2 tablets by mouth  "Every 6 (Six) Hours.          Orders (last 24 hrs)        Start     Ordered    11/10/24 0000  ibuprofen (ADVIL,MOTRIN) tablet 600 mg  Every 6 Hours        Placed in \"Followed by\" Linked Group    11/08/24 1903    11/09/24 2100  acetaminophen (TYLENOL) tablet 650 mg  Every 6 Hours        Placed in \"Followed by\" Linked Group    11/08/24 1903    11/09/24 2100  Enoxaparin Sodium (LOVENOX) syringe 40 mg  Every 12 Hours         11/08/24 1903 11/09/24 0900  prenatal vitamin tablet 1 tablet  Daily         11/08/24 1903 11/09/24 0800  Ambulate Patient  2 Times Daily      Comments: After anesthesia wears off.    11/08/24 1903 11/09/24 0600  Discontinue Indwelling Urinary Catheter in AM  Once         11/08/24 1903 11/09/24 0600  CBC & Differential  Morning Draw         11/08/24 1903 11/09/24 0600  CBC Auto Differential  PROCEDURE ONCE         11/08/24 2202 11/09/24 0000  Discontinue IV  Once         11/08/24 1903 11/09/24 0000  ketorolac (TORADOL) injection 15 mg  Every 6 Hours        Placed in \"Followed by\" Linked Group    11/08/24 1903    11/08/24 2100  sodium chloride 0.9 % flush 10 mL  Every 12 Hours Scheduled         11/08/24 1401    11/08/24 2100  sodium chloride 0.9 % flush 3 mL  Every 12 Hours Scheduled         11/08/24 1903    11/08/24 2100  acetaminophen (TYLENOL) tablet 1,000 mg  Every 6 Hours        Placed in \"Followed by\" Linked Group    11/08/24 1903    11/08/24 2000  Incentive spirometry RT  Every Hour       11/08/24 1903 11/08/24 1915  oxytocin (PITOCIN) 30 units in 0.9% sodium chloride 500 mL (premix)  Once,   Status:  Discontinued        Placed in \"Followed by\" Linked Group    11/08/24 1816 11/08/24 1915  oxytocin (PITOCIN) 30 units in 0.9% sodium chloride 500 mL (premix)  Once,   Status:  Discontinued        Placed in \"Followed by\" Linked Group    11/08/24 1816 11/08/24 1915  ketorolac (TORADOL) injection 30 mg  Once         11/08/24 1816 11/08/24 1904  Code Status and Medical " "Interventions: CPR (Attempt to Resuscitate); Full  Continuous         11/08/24 1903    11/08/24 1904  Vital Signs Per hospital policy  Per Hospital Policy         11/08/24 1903 11/08/24 1904  Notify Physician  Until Discontinued         11/08/24 1903 11/08/24 1904  Patient May Shower  Once        Comments: After anesthesia wears off and with assistance    11/08/24 1903 11/08/24 1904  Diet: Regular/House; Fluid Consistency: Thin (IDDSI 0)  Diet Effective Now         11/08/24 1903    11/08/24 1904  Advance Diet As Tolerated -Regular  Until Discontinued         11/08/24 1903 11/08/24 1904  I/O  Every Shift       11/08/24 1903 11/08/24 1904  Fundal and Lochia Check  Per Hospital Policy        Comments: Q 30 min x 2, Q 1 hr x 4, Q 4 hrs x 24 hrs, then Q shift.    11/08/24 1903 11/08/24 1904  Continue Indwelling Urinary Catheter Already in Place  Once         11/08/24 1903 11/08/24 1904  Notify Provider if Bladder Distention Continues  Until Discontinued         11/08/24 1903 11/08/24 1904  Turn Cough Deep Breathe  Once         11/08/24 1903 11/08/24 1904  Encourage early intake of PO fluids  Continuous         11/08/24 1903 11/08/24 1904  Ambulate Patient 3-5 times per day (with or without Aburto)  Every Shift       11/08/24 1903 11/08/24 1904  Apply Abdominal Binding Until Discontinued  Until Discontinued         11/08/24 1903 11/08/24 1904  \"If patient tolerates food and liquids after completion of second bag of Pitocin, saline lock IV and discontinue IV fluid infusions.  Once         11/08/24 1903    11/08/24 1904  Breast pump to bed  Once         11/08/24 1903 11/08/24 1904  If indicated -- Please administer RH Immunoglobulin based on results of cord blood evaluation and fetal screen lab tests, pharmacy to dispense  Per Order Details        Comments: See Process Instructions For Reference Range Details.    11/08/24 1903    11/08/24 1904  Hemoglobin & Hematocrit, Blood  Once      " "   11/08/24 1903    11/08/24 1904  Insert Peripheral IV  Once         11/08/24 1903    11/08/24 1904  Saline Lock & Maintain IV Access  Continuous         11/08/24 1903    11/08/24 1904  Place Sequential Compression Device  Once         11/08/24 1903    11/08/24 1904  Maintain Sequential Compression Device  Continuous         11/08/24 1903    11/08/24 1903  oxyCODONE (ROXICODONE) immediate release tablet 5 mg  Every 4 Hours PRN        Placed in \"Or\" Linked Group    11/08/24 1903    11/08/24 1903  oxyCODONE (ROXICODONE) immediate release tablet 10 mg  Every 4 Hours PRN        Placed in \"Or\" Linked Group    11/08/24 1903    11/08/24 1903  docusate sodium (COLACE) capsule 100 mg  2 Times Daily PRN         11/08/24 1903    11/08/24 1903  simethicone (MYLICON) chewable tablet 80 mg  4 Times Daily PRN         11/08/24 1903    11/08/24 1903  ondansetron ODT (ZOFRAN-ODT) disintegrating tablet 4 mg  Every 6 Hours PRN        Placed in \"Or\" Linked Group    11/08/24 1903    11/08/24 1903  ondansetron (ZOFRAN) injection 4 mg  Every 6 Hours PRN        Placed in \"Or\" Linked Group    11/08/24 1903    11/08/24 1903  promethazine (PHENERGAN) tablet 12.5 mg  Every 4 Hours PRN         11/08/24 1903    11/08/24 1903  promethazine (PHENERGAN) tablet 25 mg  Every 6 Hours PRN        Placed in \"Or\" Linked Group    11/08/24 1903    11/08/24 1903  promethazine (PHENERGAN) suppository 12.5 mg  Every 6 Hours PRN        Placed in \"Or\" Linked Group    11/08/24 1903    11/08/24 1903  lanolin topical 1 Application  Every 1 Hour PRN         11/08/24 1903    11/08/24 1903  carboprost (HEMABATE) injection 250 mcg  As Needed         11/08/24 1903    11/08/24 1903  miSOPROStol (CYTOTEC) tablet 800 mcg  As Needed         11/08/24 1903 11/08/24 1903  methylergonovine (METHERGINE) injection 200 mcg  As Needed         11/08/24 1903 11/08/24 1903  hydrOXYzine (ATARAX) tablet 50 mg  Every 6 Hours PRN         11/08/24 1903 11/08/24 1903  " "Hydrocortisone (Perianal) (ANUSOL-HC) 2.5 % rectal cream 1 Application  As Needed         11/08/24 1903 11/08/24 1903  aluminum-magnesium hydroxide-simethicone (MAALOX MAX) 400-400-40 MG/5ML suspension 15 mL  Every 4 Hours PRN        Placed in \"Or\" Linked Group    11/08/24 1903    11/08/24 1903  calcium carbonate (TUMS) chewable tablet 500 mg (200 mg elemental)  Every 4 Hours PRN        Placed in \"Or\" Linked Group    11/08/24 1903 11/08/24 1903  influenza virus vacc split PF FLUZONE 0.5 mL  During Hospitalization         11/08/24 1903 11/08/24 1903  ondansetron (ZOFRAN) injection 4 mg  Once As Needed         11/08/24 1903 11/08/24 1903  naloxone (NARCAN) injection 0.4 mg  Every 1 Hour PRN         11/08/24 1903 11/08/24 1903  sodium chloride 0.9 % flush 3-10 mL  As Needed         11/08/24 1903 11/08/24 1903  sodium chloride 0.9 % infusion 40 mL  As Needed         11/08/24 1903 11/08/24 1903  oxytocin (PITOCIN) 30 units in 0.9% sodium chloride 500 mL (premix)  Once As Needed         11/08/24 1903 11/08/24 1900  Respirations  Every Hour      Comments: If respiratory rate is less than 10/min, notify the Anesthesiologist    11/08/24 1816 11/08/24 1817  Notify Provider (Specified)  Until Discontinued         11/08/24 1816 11/08/24 1817  Vital Signs Per Hospital Policy  Per Hospital Policy         11/08/24 1816 11/08/24 1817  Indwelling Urinary Catheter  Once         11/08/24 1816 11/08/24 1817  Diet: Regular/House; Fluid Consistency: Thin (IDDSI 0)  Diet Effective Now,   Status:  Canceled         11/08/24 1816 11/08/24 1817  Advance Diet As Tolerated -  Until Discontinued         11/08/24 1816 11/08/24 1817  Blood Gas, Arterial, Cord  Once         11/08/24 1816 11/08/24 1817  Blood Gas, Venous, Cord  Once         11/08/24 1816 11/08/24 1817  Vital Signs  Per Hospital Policy         11/08/24 1816 11/08/24 1817  If Respiratory Rate is Less Than 8/Min, See Narcan Order. " "Notify Anesthesiologist STAT.  Continuous         11/08/24 1816 11/08/24 1817  Blood Pressure and Pulse Every 4 Hours  Continuous,   Status:  Canceled         11/08/24 1816 11/08/24 1817  Activity & Removal of Aburto Catheter, Per Obstetrician  Continuous,   Status:  Canceled         11/08/24 1816    11/08/24 1817  Notify Anesthesiologist for Any Questions / Problems  Continuous,   Status:  Canceled         11/08/24 1816    11/08/24 1817  Notify Anesthesia for Temp Over 101.4F  Continuous,   Status:  Canceled        Comments: May discharge from PACU with temperature at or above 95 degrees.    11/08/24 1816 11/08/24 1817  Ambu Bag at Bedside  Continuous         11/08/24 1816 11/08/24 1816  Fundal & Lochia Check  Every Shift       11/08/24 1816 11/08/24 1816  oxyCODONE-acetaminophen (PERCOCET) 5-325 MG per tablet 1 tablet  Every 4 Hours PRN,   Status:  Discontinued         11/08/24 1816 11/08/24 1816  methylergonovine (METHERGINE) injection 200 mcg  Once As Needed,   Status:  Discontinued         11/08/24 1816 11/08/24 1816  carboprost (HEMABATE) injection 250 mcg  As Needed,   Status:  Discontinued         11/08/24 1816 11/08/24 1816  miSOPROStol (CYTOTEC) tablet 800 mcg  As Needed,   Status:  Discontinued         11/08/24 1816 11/08/24 1816  ondansetron ODT (ZOFRAN-ODT) disintegrating tablet 4 mg  Every 6 Hours PRN,   Status:  Discontinued        Placed in \"Or\" Linked Group    11/08/24 1816 11/08/24 1816  ondansetron (ZOFRAN) injection 4 mg  Every 6 Hours PRN,   Status:  Discontinued        Placed in \"Or\" Linked Group    11/08/24 1816 11/08/24 1816  promethazine (PHENERGAN) tablet 12.5 mg  Every 6 Hours PRN,   Status:  Discontinued         11/08/24 1816 11/08/24 1600  Vital Signs q 4 while awake  Every 4 Hours      Comments: While the patient is awake.    11/08/24 1401    11/08/24 1557  methylergonovine (METHERGINE) 0.2 MG/ML injection  - ADS Override Pull        Note to Pharmacy: " "Created by cabinet override    11/08/24 1557    11/08/24 1519  Fentanyl, Urine - Urine, Clean Catch  Once         11/08/24 1518    11/08/24 1507  Urine Drug Screen - Urine, Clean Catch  STAT         11/08/24 1506    11/08/24 1500  lactated ringers bolus 1,000 mL  Once         11/08/24 1401    11/08/24 1500  lactated ringers infusion  Continuous         11/08/24 1401    11/08/24 1500  Sod Citrate-Citric Acid (BICITRA) oral solution 30 mL  Once         11/08/24 1401    11/08/24 1500  acetaminophen (TYLENOL) tablet 1,000 mg  Once         11/08/24 1401    11/08/24 1500  ceFAZolin 3000 mg IVPB in 100 mL NS (MBP)  Once         11/08/24 1401    11/08/24 1402  Admit To Obstetrics Inpatient  Once         11/08/24 1401    11/08/24 1402  Code Status and Medical Interventions: CPR (Attempt to Resuscitate); Full Support  Continuous,   Status:  Canceled         11/08/24 1401    11/08/24 1402  Obtain Informed Consent  Once         11/08/24 1401    11/08/24 1402  Vital Signs Per Hospital Policy  Per Hospital Policy         11/08/24 1401    11/08/24 1402  Continuous Fetal Monitoring With NST on Admission and Prior to Initiation of Oxytocin.  Per Order Details        Comments: Continuous Fetal Monitoring With NST on Admission    11/08/24 1401    11/08/24 1402  External Uterine Contraction Monitoring  Per Hospital Policy         11/08/24 1401    11/08/24 1402  Notify Provider (Specified)  Until Discontinued         11/08/24 1401    11/08/24 1402  Notify Provider of Tachysystole (Per Hospital Algorithm)  Until Discontinued         11/08/24 1401    11/08/24 1402  Notify Provider if Membranes Ruptured, Bleeding Greater Than 1 Pad Per Hour, Fetal Heart Tone Abnormality or Severe Pain  Until Discontinued         11/08/24 1401    11/08/24 1402  Up Ad Mayra  Until Discontinued         11/08/24 1401    11/08/24 1402  Insert Indwelling Urinary Catheter  Once        Placed in \"And\" Linked Group    11/08/24 1401    11/08/24 1402  Assess Need for " "Indwelling Urinary Catheter - Follow Removal Protocol  Continuous        Comments: Indwelling Urinary Catheter Removal Criteria  Discontinue Indwelling Urinary Catheter Unless One of the Following is Present  Urinary Retention or Obstruction  Chronic Aburto Catheter Use  End of Life  Critical Illness with Strict I/O   Tract or Abdominal Surgery  Stage 3/4 Sacral / Perineal Wound  Required Activity Restriction: Trauma  Required Activity Restriction: Spine Surgery  If Patient is Being Followed by Urology Contact Them PRIOR to Removal  Do Not Remove Indwelling Urinary Catheter Order is Present with a CLINICAL REASON to Maintain the Catheter. Provider is Required to Include a Clinical Reason to Maintain a Urinary Catheter    Chronic Aburto Catheter Use (Present on Admission)  Assess for Continued Need & Document Medical Necessity  If Infection is Suspected, Contact the Provider       Placed in \"And\" Linked Group    24 1401    24 1402  Abdominal Prep With Clippers  Once         24 1401    24 1402  Chlorhexadine Skin Prep Unless Otherwise Indicated  Once         24 1401    24 1402  SCD (Sequential Compression Devices)  Once         24 1401    24 1402  Document Gatorade Consumption Prior to Admission (Yes or No)  Once         24 1401    24 1402  NPO Diet NPO Type: Ice Chips  Diet Effective Now,   Status:  Canceled         24 1401    24 1402  Inpatient Anesthesiology Consult  Once        Specialty:  Anesthesiology  Provider:  (Not yet assigned)    24 1401    24 1402  Type & Screen  Once,   Status:  Canceled        Comments: If not obtained in PAT or if .      24 1401    24 1402  CBC (No Diff)  STAT,   Status:  Canceled        Comments: If not obtained in PAT.      24 1401    24 1402  Insert Peripheral IV  Once         24 1401    24 1402  Saline Lock & Maintain IV Access  Continuous,   Status:  " "Canceled         11/08/24 1401    11/08/24 1401  Urinary Catheter Care  Every Shift,   Status:  Canceled      Placed in \"And\" Linked Group    11/08/24 1401    11/08/24 1401  sodium chloride 0.9 % flush 10 mL  As Needed         11/08/24 1401    11/08/24 1401  sodium chloride 0.9 % infusion 40 mL  As Needed         11/08/24 1401    11/08/24 1401  lidocaine PF 1% (XYLOCAINE) injection 0.5 mL  Once As Needed         11/08/24 1401    10/31/24 0000  Provide Patient With ERAS Instruction Handout         10/31/24 1828    10/31/24 0000  Review PO PM Tylenol         10/31/24 1828    10/31/24 0000  Review [place] AM (Time Frame Prior to Sugery         10/31/24 1828    Unscheduled  Fundal & Lochia Check  As Needed      Comments: Every 15 Minutes x4, Then Every 30 Minutes x2, Then Every Shift    11/08/24 1816    Unscheduled  Bladder Assessment  As Needed       11/08/24 1816    Unscheduled  Bladder Scan if Patient Unable to Void 4-6 Hours After Catheter Removal  As Needed         11/08/24 1903    Unscheduled  Straight Cath Every 4-6 Hours As Needed If Patient is Unable to Void After 4-6 Hours, Bladder Scan Volume is Greater Than 500mL & Patient Has Symptoms of Bladder Discomfort / Distention  As Needed       11/08/24 1903    Unscheduled  Schedule / Prompt Voiding For Patients With Urinary Incontinence  As Needed       11/08/24 1903    Unscheduled  KPad  As Needed      Comments: PRN pain    11/08/24 1903    Unscheduled  Chewing Gum  As Needed       11/08/24 1903    Unscheduled  Warm compress  As Needed       11/08/24 1903    Unscheduled  Apply ace wrap, tight bra, or binder  As Needed       11/08/24 1903    Unscheduled  Apply ice packs  As Needed       11/08/24 1903    Unscheduled  IV Site Care  As Needed       11/08/24 1816    Unscheduled  Blood Gas, Arterial -With Co-Ox Panel: Yes  As Needed       11/08/24 1816    Signed and Held  Treponema pallidum AB w/Reflex RPR  STAT,   Status:  Canceled         Signed and Held    Signed " "and Held  Urinary Catheter Care  Every Shift       Signed and Held    Signed and Held  diphenhydrAMINE (BENADRYL) capsule 25 mg  Every 4 Hours PRN        Placed in \"Or\" Linked Group    Signed and Held    Signed and Held  diphenhydrAMINE (BENADRYL) injection 25 mg  Once As Needed        Placed in \"Or\" Linked Group    Signed and Held    Signed and Held  diphenhydrAMINE (BENADRYL) injection 25 mg  Every 4 Hours PRN        Placed in \"Or\" Linked Group    Signed and Held                     Operative/Procedure Notes (last 24 hours)        Savi Rosa MD at 24 1500          [facility]   Section Operative Note    Pre-Operative Dx:   1.  IUP at Gestational Age: 39w0d weeks    2. Previous CD x 2        Postoperative dx:    1.  Same     Procedure: Procedure(s):   SECTION REPEAT   Surgeon/Assistant: Surgeon(s):  Savi Rosa MD         Anesthesia:  Anesthesiologist: Spinal  Anesthesiologist: Noelle Gilliam DO         QBL:  266 mL        IV Fluids: 1000 mls.   UOP: 50 mls.    I/O this shift:  In: 1000 [I.V.:1000]  Out: 316 [Urine:50; Blood:266]   Antibiotics: cefazolin (Ancef)     Infant:           Gender: male infant    Weight: 3461 g (7 lb 10.1 oz)    Apgars: 8  @ 1 minute /     9  @ 5 minutes    Cord gases: Venous:  No results found for: \"PHCVEN\", \"BECVEN\"     Arterial:  No results found for: \"PHCART\", \"BECART\"     Indication for C/Section:  Prior C/S          Priority for C/Section:  routine      Procedure Details:   The patient was taken to the operating room after risks and benefits have been reviewed. The consent was reviewed and had been signed. Time Out was performed. She was prepped and draped in the normal sterile fashion in the dorsal supine position and a leftward tilt.  A Pfannenstiel skin incision made 2 cm above the pubic symphysis and carried down to the underlying layer of fascia with the scalpel.  The fascia was nicked in the midline and the incision was " extended laterally with Abbott scissors.  The anterior aspect of the incision was grasped with Kocher clamps x2 and elevated and the underlying rectus muscles were dissected off sharply and bluntly.  The inferior aspect of the incision was treated similarly.  The peritoneum was identified and entered bluntly the incision was extended with lateral traction.  An Hubert retractor was inserted.  The lower uterine segment was identified and incised in a transverse fashion with the scalpel.  The uterine cavity was entered bluntly and the incision was extended with cephalocaudad traction.  The fetus was then delivered atraumatically.  Cord was clamped and cut after 60 second delay and the infant was handed off to DRT staff for evaluation.  Cord blood and cord gases were obtained.  The placenta was delivered via uterine massage.  The uterus was then exteriorized and cleared of all clot and debris with clean laparotomy sponges.  The hysterotomy was closed in a single layer with a #1 chromic in a running locking fashion.  Hemostasis was noted.  The posterior cul-de-sac was irrigated and aspirated.  The hysterotomy was again inspected and noted to be hemostatic.  The uterus was returned to the abdomen.  The paracolic gutters were irrigated and aspirated.  Hysterotomy was inspected for third time and noted to be hemostatic.  The peritoneum was closed with 2-0 chromic in a running fashion.  The rectus muscles underlying the fascia were made hemostatic with Bovie electrocautery.  The fascia was then closed with 1 Vicryl in a running stitch.  The subcutaneous tissues were irrigated aspirated and made hemostatic with Bovie electrocautery.  The subcutaneous tissues were reapproximated with 3-0 Vicryl and the skin was closed with 4-0 Monocryl in a subcuticular fashion and sealed with Dermabond.  All counts were correct and the patient was taken to the recovery room in stable condition.         Complications:   None     Specimens:  none     Disposition:   Mother to Mother Baby/Postpartum  in stable condition currently.   Baby to NBN  in stable condition currently.       Savi Rosa MD  11/8/2024  16:37 EST        Electronically signed by Savi Rosa MD at 11/08/24 1637       Physician Progress Notes (last 24 hours)  Notes from 11/07/24 2259 through 11/08/24 2259   No notes of this type exist for this encounter.

## 2024-11-09 NOTE — PLAN OF CARE
Goal Outcome Evaluation:   VSS. Fundus, lochia and incision WDL. Pt is voiding spontaneously without dysuria and is passing flatus. Pt ambulated in her room today and sat up in a chair for approximately 2 hours today. Pain has been well-controlled with ERAS medicines and oxycodone.

## 2024-11-09 NOTE — PROGRESS NOTES
[facility]  Obstetric Progress Note    Chief Complaint: POD 1    Subjective   Feeling well. Pain controlled. Rebeka diet +amb. Lochia appr.     Objective     Vital Signs Range for the last 24 hours  Temp:  [97.5 °F (36.4 °C)-98.1 °F (36.7 °C)] 97.6 °F (36.4 °C)   BP: ()/(48-77) 90/55   Heart Rate:  [] 89   Resp:  [16-18] 18   SpO2:  [95 %-100 %] 95 %       Device (Oxygen Therapy): room air       Intake/Output last 24 hours:      Intake/Output Summary (Last 24 hours) at 11/9/2024 0911  Last data filed at 11/9/2024 0615  Gross per 24 hour   Intake 1000 ml   Output 676 ml   Net 324 ml       Intake/Output this shift:    No intake/output data recorded.    Physical Exam:  General: No acute distress   Heart RRR   Lungs CTAB     Abdomen Soft, appropriately tender, fundus firm, incision CDI   Extremities Exam of extremities: pedal edema tr +       Laboratory Results:    CBC:      Lab 11/09/24  0755 11/08/24  2227 11/06/24  0854   WBC 10.29  --  12.26*   HEMOGLOBIN 8.0*   < > 10.4*   HEMATOCRIT 25.1*   < > 32.8*   PLATELETS 175  --  273   NEUTROS ABS 7.62*  --   --    IMMATURE GRANS (ABS) 0.03  --   --    LYMPHS ABS 1.86  --   --    MONOS ABS 0.59  --   --    EOS ABS 0.14  --   --    MCV 82.8  --  83.2    < > = values in this interval not displayed.        Assessment/Plan: POD 1 s/p RCD  RPOC advance care  2. Male infant  3. Breast  4. Dc home tomorrow  5. Anemia from acute blood loss  -on iron        Savi Rosa MD  11/9/2024  09:11 EST

## 2024-11-09 NOTE — PLAN OF CARE
Problem: Adult Inpatient Plan of Care  Goal: Plan of Care Review  Outcome: Progressing  Goal: Patient-Specific Goal (Individualized)  Outcome: Progressing  Goal: Absence of Hospital-Acquired Illness or Injury  Outcome: Progressing  Intervention: Identify and Manage Fall Risk  Recent Flowsheet Documentation  Taken 11/9/2024 0640 by Savi Silva RN  Safety Promotion/Fall Prevention: safety round/check completed  Taken 11/9/2024 0552 by Savi Silva RN  Safety Promotion/Fall Prevention: safety round/check completed  Taken 11/9/2024 0540 by Savi Silva RN  Safety Promotion/Fall Prevention: safety round/check completed  Taken 11/9/2024 0400 by Savi Silva RN  Safety Promotion/Fall Prevention: safety round/check completed  Taken 11/9/2024 0247 by Savi Silva RN  Safety Promotion/Fall Prevention: safety round/check completed  Taken 11/9/2024 0110 by Savi Silva RN  Safety Promotion/Fall Prevention: safety round/check completed  Taken 11/9/2024 0028 by Savi Silva RN  Safety Promotion/Fall Prevention: safety round/check completed  Taken 11/8/2024 2245 by Savi Silva RN  Safety Promotion/Fall Prevention: safety round/check completed  Taken 11/8/2024 2213 by Savi Silva RN  Safety Promotion/Fall Prevention: safety round/check completed  Taken 11/8/2024 2130 by Savi Silva RN  Safety Promotion/Fall Prevention: safety round/check completed  Taken 11/8/2024 2022 by Savi Silva RN  Safety Promotion/Fall Prevention: safety round/check completed  Intervention: Prevent and Manage VTE (Venous Thromboembolism) Risk  Recent Flowsheet Documentation  Taken 11/8/2024 2022 by Savi Silva RN  VTE Prevention/Management: SCDs (sequential compression devices) on  Goal: Optimal Comfort and Wellbeing  Outcome: Progressing  Intervention: Monitor Pain and Promote Comfort  Recent Flowsheet Documentation  Taken 11/9/2024 0552 by Savi Silva RN  Pain Management  Interventions: pain medication given  Taken 2024 0540 by Savi Silva RN  Pain Management Interventions: around-the-clock dosing utilized  Taken 2024 0247 by Savi Silva RN  Pain Management Interventions: around-the-clock dosing utilized  Taken 2024 0028 by Savi Silva RN  Pain Management Interventions: around-the-clock dosing utilized  Taken 2024 2213 by Savi Silva RN  Pain Management Interventions: pain medication given  Taken 2024 by Savi Silva RN  Pain Management Interventions: around-the-clock dosing utilized  Intervention: Provide Person-Centered Care  Recent Flowsheet Documentation  Taken 2024 by Savi Silva RN  Trust Relationship/Rapport:   care explained   choices provided   emotional support provided  Goal: Readiness for Transition of Care  Outcome: Progressing     Problem: Skin Injury Risk Increased  Goal: Skin Health and Integrity  Outcome: Progressing  Intervention: Optimize Skin Protection  Recent Flowsheet Documentation  Taken 2024 0640 by Savi Silva, RN  Activity Management: (pt stood at bedside) other (see comments)  Taken 2024 by Savi Silva, RN  Activity Management: activity encouraged  Head of Bed (HOB) Positioning: HOB elevated     Problem:  Delivery  Goal: Bleeding is Controlled  Outcome: Progressing  Goal: Stable Fetal Wellbeing  Outcome: Progressing  Goal: Absence of Infection Signs and Symptoms  Outcome: Progressing  Goal: Effective Oxygenation and Ventilation  Outcome: Progressing

## 2024-11-09 NOTE — ANESTHESIA POSTPROCEDURE EVALUATION
Patient: Noelle Astudillo    Procedure Summary       Date: 24 Room / Location: [facility] LABOR DELIVERY   [facility]    Anesthesia Start: 1547 Anesthesia Stop: 163    Procedure:  SECTION REPEAT (Abdomen) Diagnosis:     Surgeons: Savi Rosa MD Provider: Noelle Gilliam DO    Anesthesia Type: spinal, ITN ASA Status: 3            Anesthesia Type: spinal, ITN    Vitals  Vitals Value Taken Time   /56 24 1143   Temp 97.7 °F (36.5 °C) 24 1143   Pulse 90 24 1143   Resp 18 24 1143   SpO2 95 % 24 0819           Post Anesthesia Care and Evaluation    Patient location during evaluation: bedside  Patient participation: complete - patient participated  Level of consciousness: awake  Pain score: 2  Pain management: satisfactory to patient    Airway patency: patent  Anesthetic complications: No anesthetic complications  PONV Status: none  Cardiovascular status: acceptable and hemodynamically stable  Respiratory status: acceptable  Hydration status: acceptable  Post Neuraxial Block status: Motor and sensory function returned to baseline and No signs or symptoms of PDPH

## 2024-11-10 VITALS
WEIGHT: 267 LBS | BODY MASS INDEX: 47.31 KG/M2 | HEIGHT: 63 IN | RESPIRATION RATE: 18 BRPM | TEMPERATURE: 97.9 F | OXYGEN SATURATION: 95 % | SYSTOLIC BLOOD PRESSURE: 110 MMHG | HEART RATE: 86 BPM | DIASTOLIC BLOOD PRESSURE: 55 MMHG

## 2024-11-10 RX ORDER — OXYCODONE HYDROCHLORIDE 5 MG/1
5 TABLET ORAL EVERY 6 HOURS PRN
Qty: 12 TABLET | Refills: 0 | Status: SHIPPED | OUTPATIENT
Start: 2024-11-10 | End: 2024-11-15

## 2024-11-10 RX ORDER — PSEUDOEPHEDRINE HCL 30 MG
100 TABLET ORAL 2 TIMES DAILY PRN
Qty: 60 CAPSULE | Refills: 0 | Status: SHIPPED | OUTPATIENT
Start: 2024-11-10

## 2024-11-10 RX ORDER — FERROUS SULFATE 325(65) MG
325 TABLET ORAL
Qty: 30 TABLET | Refills: 0 | Status: SHIPPED | OUTPATIENT
Start: 2024-11-11

## 2024-11-10 RX ORDER — IBUPROFEN 600 MG/1
600 TABLET, FILM COATED ORAL EVERY 6 HOURS
Qty: 60 TABLET | Refills: 0 | Status: ON HOLD | OUTPATIENT
Start: 2024-11-10 | End: 2024-11-19

## 2024-11-10 RX ADMIN — FERROUS SULFATE TAB 325 MG (65 MG ELEMENTAL FE) 325 MG: 325 (65 FE) TAB at 10:05

## 2024-11-10 RX ADMIN — OXYCODONE HYDROCHLORIDE 10 MG: 10 TABLET ORAL at 10:05

## 2024-11-10 RX ADMIN — SIMETHICONE 80 MG: 80 TABLET, CHEWABLE ORAL at 10:05

## 2024-11-10 RX ADMIN — IBUPROFEN 600 MG: 600 TABLET, FILM COATED ORAL at 06:03

## 2024-11-10 RX ADMIN — ACETAMINOPHEN 650 MG: 325 TABLET ORAL at 10:05

## 2024-11-10 RX ADMIN — DOCUSATE SODIUM 100 MG: 100 CAPSULE, LIQUID FILLED ORAL at 10:05

## 2024-11-10 RX ADMIN — ACETAMINOPHEN 650 MG: 325 TABLET ORAL at 02:27

## 2024-11-10 RX ADMIN — OXYCODONE HYDROCHLORIDE 10 MG: 10 TABLET ORAL at 06:32

## 2024-11-10 RX ADMIN — OXYCODONE HYDROCHLORIDE 10 MG: 10 TABLET ORAL at 02:29

## 2024-11-10 RX ADMIN — IBUPROFEN 600 MG: 600 TABLET, FILM COATED ORAL at 13:01

## 2024-11-10 NOTE — PROGRESS NOTES
[facility]  Obstetric Progress Note    Chief Complaint: POD 2    Subjective   Feeling well. Pain controlled. Rebeka diet +amb. Lochia appr.     Objective     Vital Signs Range for the last 24 hours  Temp:  [97.6 °F (36.4 °C)-98.2 °F (36.8 °C)] 97.9 °F (36.6 °C)   BP: ()/(52-56) 110/55   Heart Rate:  [] 86   Resp:  [16-18] 18                   Intake/Output last 24 hours:      Intake/Output Summary (Last 24 hours) at 11/10/2024 1044  Last data filed at 11/9/2024 1924  Gross per 24 hour   Intake --   Output 1300 ml   Net -1300 ml       Intake/Output this shift:    No intake/output data recorded.    Physical Exam:  General: No acute distress   Heart RRR   Lungs CTAB     Abdomen Soft, appropriately tender, fundus firm, incision CDI   Extremities Exam of extremities: pedal edema tr +       Laboratory Results:    CBC:      Lab 11/09/24  0755 11/08/24  2227 11/06/24  0854   WBC 10.29  --  12.26*   HEMOGLOBIN 8.0*   < > 10.4*   HEMATOCRIT 25.1*   < > 32.8*   PLATELETS 175  --  273   NEUTROS ABS 7.62*  --   --    IMMATURE GRANS (ABS) 0.03  --   --    LYMPHS ABS 1.86  --   --    MONOS ABS 0.59  --   --    EOS ABS 0.14  --   --    MCV 82.8  --  83.2    < > = values in this interval not displayed.        Assessment/Plan: POD 2 s/p RCD  RPOC advance care  2. Male infant  3. Dc home  4. RH neg rhogam not needed        Savi Rosa MD  11/10/2024  10:44 EST

## 2024-11-10 NOTE — DISCHARGE SUMMARY
[facility]  Discharge Summary      Patient: Noelle Astudillo            : 1995  MRN: [id number]  CSN: [id number]  Consult:   Consults       Date and Time Order Name Status Description    2024  2:02 PM Inpatient Anesthesiology Consult                 Gestational Age at Discharge:  39w0d, delivered      Admission  Diagnosis: <principal problem not specified>    Discharge Diagnosis:   Patient Active Problem List   Diagnosis    Delivered by  delivery following previous  delivery    Term pregnancy       Date of Admission: 2024    Date of Discharge:  11/10/24    Procedures:  RCD           Service:  Obstetrics    Hospital Course:       Pt admitted for  RCD  after uncomplicated pregnancy. She was delivered without complication. She delivered a VMI with Apgars 8/9 via RCD. See note for full detail. Her postpartum course was uncomplicated and was discharged home on PPD 2    Labs:    Lab Results (last 24 hours)       ** No results found for the last 24 hours. **          HOSPITAL LABS:  Lab Results   Component Value Date    WBC 10.29 2024    HGB 8.0 (L) 2024    HCT 25.1 (L) 2024    MCV 82.8 2024     2024    CREATININE 0.72 2020    AST 22 2020    ALT 19 2020     Results from last 7 days   Lab Units 24  0854   ABO TYPING  A   RH TYPING  Negative   ANTIBODY SCREEN  Negative       IMAGING        Discharge Medications     Discharge Medications        New Medications        Instructions Start Date   docusate sodium 100 MG capsule   100 mg, Oral, 2 Times Daily PRN      ferrous sulfate 325 (65 FE) MG tablet   325 mg, Oral, Daily With Breakfast   Start Date: 2024     ibuprofen 600 MG tablet  Commonly known as: ADVIL,MOTRIN   600 mg, Oral, Every 6 Hours      oxyCODONE 5 MG immediate release tablet  Commonly known as: ROXICODONE   5 mg, Oral, Every 6 Hours PRN             Continue These Medications        Instructions  Start Date   Vitafol Gummies 3.33-0.333-34.8 MG chewable tablet   2 tablets, Oral, Daily             Stop These Medications      acetaminophen 325 MG tablet  Commonly known as: TYLENOL              Allergies: No Known Allergies     Discharge Disposition:  To Home    Discharge Condition:  Stable    Discharge Diet: Regular    Activity at Discharge: pelvic rest, no lifting    Follow-up Appointments: 2 weeks        Savi Rosa MD  11/10/24  12:02 EST

## 2024-11-18 ENCOUNTER — HOSPITAL ENCOUNTER (OUTPATIENT)
Facility: HOSPITAL | Age: 29
Discharge: HOME | End: 2024-11-19
Attending: OBSTETRICS & GYNECOLOGY | Admitting: STUDENT IN AN ORGANIZED HEALTH CARE EDUCATION/TRAINING PROGRAM
Payer: MEDICAID

## 2024-11-18 LAB
ABO GROUP BLD: NORMAL
ALP SERPL-CCNC: 152 U/L (ref 39–117)
ALT SERPL W P-5'-P-CCNC: 17 U/L (ref 1–33)
APTT PPP: 27.4 SECONDS (ref 22–39)
AST SERPL-CCNC: 16 U/L (ref 1–32)
BILIRUB SERPL-MCNC: 0.2 MG/DL (ref 0–1.2)
BLD GP AB SCN SERPL QL: NEGATIVE
CREAT SERPL-MCNC: 0.7 MG/DL (ref 0.57–1)
DEPRECATED RDW RBC AUTO: 44.4 FL (ref 37–54)
ERYTHROCYTE [DISTWIDTH] IN BLOOD BY AUTOMATED COUNT: 14.7 % (ref 12.3–15.4)
EXPIRATION DATE: ABNORMAL
FIBRINOGEN PPP-MCNC: 370 MG/DL (ref 203–470)
HCT VFR BLD AUTO: 29.7 % (ref 34–46.6)
HGB BLD-MCNC: 9.1 G/DL (ref 12–15.9)
INR PPP: 1.01 (ref 0.89–1.12)
LDH SERPL-CCNC: 363 U/L (ref 135–214)
Lab: ABNORMAL
MCH RBC QN AUTO: 26 PG (ref 26.6–33)
MCHC RBC AUTO-ENTMCNC: 30.6 G/DL (ref 31.5–35.7)
MCV RBC AUTO: 84.9 FL (ref 79–97)
PLATELET # BLD AUTO: 306 10*3/MM3 (ref 140–450)
PMV BLD AUTO: 10.4 FL (ref 6–12)
PROT UR STRIP-MCNC: ABNORMAL MG/DL
PROTHROMBIN TIME: 13.5 SECONDS (ref 12.2–14.5)
RBC # BLD AUTO: 3.5 10*6/MM3 (ref 3.77–5.28)
RH BLD: NEGATIVE
T&S EXPIRATION DATE: NORMAL
URATE SERPL-MCNC: 5.9 MG/DL (ref 2.4–5.7)
WBC NRBC COR # BLD AUTO: 10.13 10*3/MM3 (ref 3.4–10.8)

## 2024-11-18 PROCEDURE — 96375 TX/PRO/DX INJ NEW DRUG ADDON: CPT

## 2024-11-18 PROCEDURE — 84550 ASSAY OF BLOOD/URIC ACID: CPT | Performed by: OBSTETRICS & GYNECOLOGY

## 2024-11-18 PROCEDURE — G0378 HOSPITAL OBSERVATION PER HR: HCPCS

## 2024-11-18 PROCEDURE — 84460 ALANINE AMINO (ALT) (SGPT): CPT | Performed by: OBSTETRICS & GYNECOLOGY

## 2024-11-18 PROCEDURE — 96365 THER/PROPH/DIAG IV INF INIT: CPT

## 2024-11-18 PROCEDURE — 86850 RBC ANTIBODY SCREEN: CPT | Performed by: OBSTETRICS & GYNECOLOGY

## 2024-11-18 PROCEDURE — 83615 LACTATE (LD) (LDH) ENZYME: CPT | Performed by: OBSTETRICS & GYNECOLOGY

## 2024-11-18 PROCEDURE — 82247 BILIRUBIN TOTAL: CPT | Performed by: OBSTETRICS & GYNECOLOGY

## 2024-11-18 PROCEDURE — 84075 ASSAY ALKALINE PHOSPHATASE: CPT | Performed by: OBSTETRICS & GYNECOLOGY

## 2024-11-18 PROCEDURE — 85027 COMPLETE CBC AUTOMATED: CPT | Performed by: OBSTETRICS & GYNECOLOGY

## 2024-11-18 PROCEDURE — 86900 BLOOD TYPING SEROLOGIC ABO: CPT | Performed by: OBSTETRICS & GYNECOLOGY

## 2024-11-18 PROCEDURE — 96376 TX/PRO/DX INJ SAME DRUG ADON: CPT

## 2024-11-18 PROCEDURE — 85730 THROMBOPLASTIN TIME PARTIAL: CPT | Performed by: OBSTETRICS & GYNECOLOGY

## 2024-11-18 PROCEDURE — 25010000002 MAGNESIUM SULFATE 20 GM/500ML SOLUTION: Performed by: OBSTETRICS & GYNECOLOGY

## 2024-11-18 PROCEDURE — 85384 FIBRINOGEN ACTIVITY: CPT | Performed by: OBSTETRICS & GYNECOLOGY

## 2024-11-18 PROCEDURE — 82565 ASSAY OF CREATININE: CPT | Performed by: OBSTETRICS & GYNECOLOGY

## 2024-11-18 PROCEDURE — 25810000003 DEXTROSE 5% IN LACTATED RINGERS PER 1000 ML: Performed by: OBSTETRICS & GYNECOLOGY

## 2024-11-18 PROCEDURE — 81002 URINALYSIS NONAUTO W/O SCOPE: CPT | Performed by: OBSTETRICS & GYNECOLOGY

## 2024-11-18 PROCEDURE — 84450 TRANSFERASE (AST) (SGOT): CPT | Performed by: OBSTETRICS & GYNECOLOGY

## 2024-11-18 PROCEDURE — 96361 HYDRATE IV INFUSION ADD-ON: CPT

## 2024-11-18 PROCEDURE — 25010000002 METOCLOPRAMIDE PER 10 MG: Performed by: OBSTETRICS & GYNECOLOGY

## 2024-11-18 PROCEDURE — 85610 PROTHROMBIN TIME: CPT | Performed by: OBSTETRICS & GYNECOLOGY

## 2024-11-18 PROCEDURE — 86901 BLOOD TYPING SEROLOGIC RH(D): CPT | Performed by: OBSTETRICS & GYNECOLOGY

## 2024-11-18 RX ORDER — SODIUM CHLORIDE 0.9 % (FLUSH) 0.9 %
10 SYRINGE (ML) INJECTION AS NEEDED
Status: DISCONTINUED | OUTPATIENT
Start: 2024-11-18 | End: 2024-11-19 | Stop reason: HOSPADM

## 2024-11-18 RX ORDER — OXYCODONE AND ACETAMINOPHEN 10; 325 MG/1; MG/1
1 TABLET ORAL ONCE AS NEEDED
Status: COMPLETED | OUTPATIENT
Start: 2024-11-18 | End: 2024-11-18

## 2024-11-18 RX ORDER — MAGNESIUM SULFATE HEPTAHYDRATE 40 MG/ML
2 INJECTION, SOLUTION INTRAVENOUS CONTINUOUS
Status: DISCONTINUED | OUTPATIENT
Start: 2024-11-18 | End: 2024-11-19 | Stop reason: HOSPADM

## 2024-11-18 RX ORDER — DEXTROSE, SODIUM CHLORIDE, SODIUM LACTATE, POTASSIUM CHLORIDE, AND CALCIUM CHLORIDE 5; .6; .31; .03; .02 G/100ML; G/100ML; G/100ML; G/100ML; G/100ML
75 INJECTION, SOLUTION INTRAVENOUS CONTINUOUS
Status: ACTIVE | OUTPATIENT
Start: 2024-11-18 | End: 2024-11-19

## 2024-11-18 RX ORDER — SODIUM CHLORIDE 0.9 % (FLUSH) 0.9 %
10 SYRINGE (ML) INJECTION EVERY 12 HOURS SCHEDULED
Status: DISCONTINUED | OUTPATIENT
Start: 2024-11-18 | End: 2024-11-19 | Stop reason: HOSPADM

## 2024-11-18 RX ORDER — CALCIUM GLUCONATE 94 MG/ML
1 INJECTION, SOLUTION INTRAVENOUS ONCE AS NEEDED
Status: DISCONTINUED | OUTPATIENT
Start: 2024-11-18 | End: 2024-11-19 | Stop reason: HOSPADM

## 2024-11-18 RX ORDER — METOCLOPRAMIDE HYDROCHLORIDE 5 MG/ML
10 INJECTION INTRAMUSCULAR; INTRAVENOUS EVERY 6 HOURS
Status: DISCONTINUED | OUTPATIENT
Start: 2024-11-18 | End: 2024-11-19 | Stop reason: HOSPADM

## 2024-11-18 RX ADMIN — SODIUM CHLORIDE, SODIUM LACTATE, POTASSIUM CHLORIDE, CALCIUM CHLORIDE AND DEXTROSE MONOHYDRATE 75 ML/HR: 5; 600; 310; 30; 20 INJECTION, SOLUTION INTRAVENOUS at 15:23

## 2024-11-18 RX ADMIN — METOCLOPRAMIDE 10 MG: 5 INJECTION, SOLUTION INTRAMUSCULAR; INTRAVENOUS at 17:56

## 2024-11-18 RX ADMIN — METOCLOPRAMIDE 10 MG: 5 INJECTION, SOLUTION INTRAMUSCULAR; INTRAVENOUS at 22:43

## 2024-11-18 RX ADMIN — MAGNESIUM SULFATE IN WATER 2 G/HR: 20 INJECTION, SOLUTION INTRAVENOUS at 22:44

## 2024-11-18 RX ADMIN — OXYCODONE HYDROCHLORIDE AND ACETAMINOPHEN 1 TABLET: 10; 325 TABLET ORAL at 16:57

## 2024-11-18 NOTE — H&P
"[facility]  Obstetric History and Physical    Referring Provider: Savi Rosa MD      Chief Complaint   Patient presents with    Headache       Subjective     Patient is a 29 y.o. female  currently postop day #10 after repeat  section at term with complaint of a headache.  Patient reports 8 out of 10 headache been present for 5 days.  Patient reports felt the headache 5 days ago without associated fever, trauma, environmental infectious exposure, nausea, vomiting, or any other concerns.  Patient states went to the ED 2 days ago. She had  a CT of the brain reported as normal, and given Fioricet.  Headache resolved for 5 hours.. Patient denies any history of chronic headaches.  Patient  headache unrelieved with over-the-counter analgesics.  Patient underwent a repeat  section at term without incident discharged home postop day 2 without complaints and normal blood pressure.    .    The following portions of the patients history were reviewed and updated as appropriate: current medications, allergies, past medical history, past surgical history, past family history, past social history, and problem list .       Prenatal Information:   Maternal Prenatal Labs  Blood Type No results found for: \"ABO\"   Rh Status No results found for: \"RH\"   Antibody Screen No results found for: \"ABSCRN\"   Gonnorhea No results found for: \"GCCX\"   Chlamydia No results found for: \"CLAMYDCU\"   RPR No results found for: \"RPR\"   Syphilis Antibody No results found for: \"SYPHILIS\"   Rubella No results found for: \"RUBELLAIGGIN\"   Hepatitis B Surface Antigen No results found for: \"HEPBSAG\"   HIV-1 Antibody No results found for: \"LABHIV1\"   Hepatitis C Antibody No results found for: \"HEPCAB\"   Rapid Urin Drug Screen No results found for: \"AMPMETHU\", \"BARBITSCNUR\", \"LABBENZSCN\", \"LABMETHSCN\", \"LABOPIASCN\", \"THCURSCR\", \"COCAINEUR\", \"AMPHETSCREEN\", \"PROPOXSCN\", \"BUPRENORSCNU\", \"METAMPSCNUR\", \"OXYCODONESCN\", \"TRICYCLICSCN\" " "  Group B Strep Culture No results found for: \"GBSANTIGEN\"           External Prenatal Results       Pregnancy Outside Results - Transcribed From Office Records - See Scanned Records For Details       Test Value Date Time    ABO  A  11/06/24 0854    Rh  Negative  11/06/24 0854    Antibody Screen  Negative  11/06/24 0854       Negative  08/21/24 1020    Varicella IgG       Rubella       Hgb  8.0 g/dL 11/09/24 0755       8.8 g/dL 11/08/24 2227       10.4 g/dL 11/06/24 0854       10.3 g/dL 08/21/24 1020    Hct  25.1 % 11/09/24 0755       27.8 % 11/08/24 2227       32.8 % 11/06/24 0854       31.5 % 08/21/24 1020    HgB A1c        1h GTT  120 mg/dL 08/21/24 1020    3h GTT Fasting       3h GTT 1 hour       3h GTT 2 hour       3h GTT 3 hour        Gonorrhea (discrete)       Chlamydia (discrete)       RPR       Syphils cascade: TP-Ab (FTA)  Non-Reactive  11/06/24 0854       Non-Reactive  08/21/24 1020    TP-Ab  Non-Reactive  11/06/24 0854       Non-Reactive  08/21/24 1020    TP-Ab (EIA)       TPPA       HBsAg       Herpes Simplex Virus PCR       Herpes Simplex VIrus Culture       HIV       Hep C RNA Quant PCR       Hep C Antibody       AFP       NIPT       Cystic Fibrosis (Georgie)       Cystic Fibroisis        Spinal Muscular atrophy       Fragile X       Group B Strep       GBS Susceptibility to Clindamycin       GBS Susceptibility to Erythromycin       Fetal Fibronectin       Genetic Testing, Maternal Blood                 Drug Screening       Test Value Date Time    Urine Drug Screen       Amphetamine Screen  Negative  11/08/24 1507    Barbiturate Screen  Negative  11/08/24 1507    Benzodiazepine Screen  Negative  11/08/24 1507    Methadone Screen  Negative  11/08/24 1507    Phencyclidine Screen  Negative  11/08/24 1507    Opiates Screen  Negative  11/08/24 1507    THC Screen  Negative  11/08/24 1507    Cocaine Screen       Propoxyphene Screen       Buprenorphine Screen  Negative  11/08/24 1507    Methamphetamine Screen  "      Oxycodone Screen  Negative  24 1507    Tricyclic Antidepressants Screen  Negative  24 1507              Legend    ^: Historical                              Past OB History:       OB History    Para Term  AB Living   7 3 3 0 4 3   SAB IAB Ectopic Molar Multiple Live Births   3 0 0 0 0 3      # Outcome Date GA Lbr Jose/2nd Weight Sex Type Anes PTL Lv   7 Term 24 39w0d  3461 g (7 lb 10.1 oz) M CS-LTranv Spinal N LUIS FERNANDO      Name: Reese King      Apgar1: 8  Apgar5: 9   6 Term 22 39w1d  4103 g (9 lb 0.7 oz) M CS-LTranv Spinal N LUIS FERNANDO      Name: TAYO KING      Apgar1: 8  Apgar5: 9   5 Term 21 40w1d  3544 g (7 lb 13 oz) M  EPI N LUIS FERNANDO      Complications: Failure to Progress in Second Stage      Name: Jared Pascual SAB 20           3 SAB 20           2 SAB 19           1 AB                Past Medical History: Past Medical History:   Diagnosis Date    Rh incompatibility 2024    Urinary tract infection     not frequent before       Past Surgical History Past Surgical History:   Procedure Laterality Date     SECTION N/A 2022    Procedure:  SECTION REPEAT;  Surgeon: Savi Rosa MD;  Location:  [facility];  Service: Obstetrics/Gynecology;  Laterality: N/A;     SECTION       SECTION N/A 2024    Procedure:  SECTION REPEAT;  Surgeon: Savi Rosa MD;  Location:  [facility];  Service: Obstetrics/Gynecology;  Laterality: N/A;    CHOLECYSTECTOMY      VEIN LIGATION        Family History: Family History   Problem Relation Age of Onset    Hypertension Father     Colon cancer Paternal Grandfather     Prostate cancer Paternal Grandfather     Clotting disorder Paternal Grandmother     Diabetes Maternal Grandfather     Heart disease Maternal Grandfather     Breast cancer Paternal Aunt       Social History:  reports that she quit smoking about 9 months ago. Her  smoking use included cigarettes. She has never used smokeless tobacco.   reports that she does not currently use alcohol.   reports no history of drug use.                   General ROS Negative Findings:Visual Changes, Epigastric pain, Anorexia, and Nausia/Vomiting    ROS     All other systems have been reviewed and are neg  Objective       Vital Signs Range for the last 24 hours  Temperature: Temp:  [98.4 °F (36.9 °C)] 98.4 °F (36.9 °C)   Temp Source: Temp src: Oral   BP: BP: (158-166)/(63-74) 158/63   Pulse: Heart Rate:  [48] 48   Respirations: Resp:  [17] 17   SPO2:     O2 Amount (l/min):     O2 Devices     Weight:       Physical Examination:   General:   alert, appears stated age, and cooperative   Skin:   normal   HEENT:     Lungs:   clear to auscultation bilaterally   Heart:  Heart rate 48 rhythm without murmurs.   Gastrointestinal: Soft, uterus firm below umbilicus nontender incision healing well amount of ecchymosis noted above the incision.   Lower Extremities: Trace edema, no calf tenderness   :    Musculoskeletal:     Neuro: No focal deficits noted DTR's 2+/4               Laboratory Results:   Lab Results (last 24 hours)       ** No results found for the last 24 hours. **          Radiology Review:   Imaging Results (Last 24 Hours)       ** No results found for the last 24 hours. **          Other Studies:    Assessment & Plan       Postpartum hypertension        Assessment:  1. POD# 10 after a repeat  section at term without complications.  2.  Postpartum hypertension      Persistent headache etiology unclear hypertension versus other etiologies.  3.  Postop anemia.  4.      Plan:  1.  Admit, IV access, labs, magnesium sulfate antiseizure prophylaxis, control blood pressure, headache persists proceed with brain imaging.MRI with contrast and MRV.  2. Plan of care has been reviewed with patient.  3.  Risks, benefits of treatment plan have been discussed.  4.  All questions have been  answered.  5      Tavo Campbell,   11/18/2024  14:50 EST

## 2024-11-19 ENCOUNTER — MATERNAL SCREENING (OUTPATIENT)
Dept: CALL CENTER | Facility: HOSPITAL | Age: 29
End: 2024-11-19
Payer: MEDICAID

## 2024-11-19 ENCOUNTER — APPOINTMENT (OUTPATIENT)
Dept: MRI IMAGING | Facility: HOSPITAL | Age: 29
End: 2024-11-19
Payer: MEDICAID

## 2024-11-19 VITALS
OXYGEN SATURATION: 97 % | TEMPERATURE: 98.2 F | RESPIRATION RATE: 18 BRPM | DIASTOLIC BLOOD PRESSURE: 65 MMHG | HEART RATE: 86 BPM | SYSTOLIC BLOOD PRESSURE: 125 MMHG

## 2024-11-19 LAB
ALP SERPL-CCNC: 142 U/L (ref 39–117)
ALT SERPL W P-5'-P-CCNC: 14 U/L (ref 1–33)
AST SERPL-CCNC: 12 U/L (ref 1–32)
BILIRUB SERPL-MCNC: <0.2 MG/DL (ref 0–1.2)
CREAT SERPL-MCNC: 0.71 MG/DL (ref 0.57–1)
DEPRECATED RDW RBC AUTO: 46.3 FL (ref 37–54)
ERYTHROCYTE [DISTWIDTH] IN BLOOD BY AUTOMATED COUNT: 15.2 % (ref 12.3–15.4)
HCT VFR BLD AUTO: 30.9 % (ref 34–46.6)
HGB BLD-MCNC: 9.5 G/DL (ref 12–15.9)
LDH SERPL-CCNC: 228 U/L (ref 135–214)
MCH RBC QN AUTO: 26 PG (ref 26.6–33)
MCHC RBC AUTO-ENTMCNC: 30.7 G/DL (ref 31.5–35.7)
MCV RBC AUTO: 84.4 FL (ref 79–97)
PLATELET # BLD AUTO: 329 10*3/MM3 (ref 140–450)
PMV BLD AUTO: 10.1 FL (ref 6–12)
RBC # BLD AUTO: 3.66 10*6/MM3 (ref 3.77–5.28)
URATE SERPL-MCNC: 5.9 MG/DL (ref 2.4–5.7)
WBC NRBC COR # BLD AUTO: 10.39 10*3/MM3 (ref 3.4–10.8)

## 2024-11-19 PROCEDURE — 85027 COMPLETE CBC AUTOMATED: CPT | Performed by: STUDENT IN AN ORGANIZED HEALTH CARE EDUCATION/TRAINING PROGRAM

## 2024-11-19 PROCEDURE — 25010000002 KETOROLAC TROMETHAMINE PER 15 MG: Performed by: OBSTETRICS & GYNECOLOGY

## 2024-11-19 PROCEDURE — 25810000003 DEXTROSE 5% IN LACTATED RINGERS PER 1000 ML: Performed by: OBSTETRICS & GYNECOLOGY

## 2024-11-19 PROCEDURE — 96366 THER/PROPH/DIAG IV INF ADDON: CPT

## 2024-11-19 PROCEDURE — 82565 ASSAY OF CREATININE: CPT | Performed by: STUDENT IN AN ORGANIZED HEALTH CARE EDUCATION/TRAINING PROGRAM

## 2024-11-19 PROCEDURE — 83615 LACTATE (LD) (LDH) ENZYME: CPT | Performed by: STUDENT IN AN ORGANIZED HEALTH CARE EDUCATION/TRAINING PROGRAM

## 2024-11-19 PROCEDURE — 96376 TX/PRO/DX INJ SAME DRUG ADON: CPT

## 2024-11-19 PROCEDURE — 84450 TRANSFERASE (AST) (SGOT): CPT | Performed by: STUDENT IN AN ORGANIZED HEALTH CARE EDUCATION/TRAINING PROGRAM

## 2024-11-19 PROCEDURE — 96375 TX/PRO/DX INJ NEW DRUG ADDON: CPT

## 2024-11-19 PROCEDURE — G0378 HOSPITAL OBSERVATION PER HR: HCPCS

## 2024-11-19 PROCEDURE — 84460 ALANINE AMINO (ALT) (SGPT): CPT | Performed by: STUDENT IN AN ORGANIZED HEALTH CARE EDUCATION/TRAINING PROGRAM

## 2024-11-19 PROCEDURE — 84550 ASSAY OF BLOOD/URIC ACID: CPT | Performed by: STUDENT IN AN ORGANIZED HEALTH CARE EDUCATION/TRAINING PROGRAM

## 2024-11-19 PROCEDURE — 82247 BILIRUBIN TOTAL: CPT | Performed by: STUDENT IN AN ORGANIZED HEALTH CARE EDUCATION/TRAINING PROGRAM

## 2024-11-19 PROCEDURE — 84075 ASSAY ALKALINE PHOSPHATASE: CPT | Performed by: STUDENT IN AN ORGANIZED HEALTH CARE EDUCATION/TRAINING PROGRAM

## 2024-11-19 PROCEDURE — 70544 MR ANGIOGRAPHY HEAD W/O DYE: CPT

## 2024-11-19 PROCEDURE — 25010000002 METOCLOPRAMIDE PER 10 MG: Performed by: OBSTETRICS & GYNECOLOGY

## 2024-11-19 RX ORDER — CYCLOBENZAPRINE HCL 10 MG
10 TABLET ORAL 3 TIMES DAILY
Status: DISCONTINUED | OUTPATIENT
Start: 2024-11-19 | End: 2024-11-19 | Stop reason: HOSPADM

## 2024-11-19 RX ORDER — CYCLOBENZAPRINE HCL 10 MG
10 TABLET ORAL 3 TIMES DAILY PRN
Qty: 10 TABLET | Refills: 0 | Status: SHIPPED | OUTPATIENT
Start: 2024-11-19 | End: 2024-11-19 | Stop reason: HOSPADM

## 2024-11-19 RX ORDER — IBUPROFEN 600 MG/1
600 TABLET, FILM COATED ORAL EVERY 6 HOURS
Qty: 60 TABLET | Refills: 0 | Status: SHIPPED | OUTPATIENT
Start: 2024-11-19

## 2024-11-19 RX ORDER — FERROUS SULFATE 325(65) MG
325 TABLET ORAL
Status: DISCONTINUED | OUTPATIENT
Start: 2024-11-19 | End: 2024-11-19 | Stop reason: HOSPADM

## 2024-11-19 RX ORDER — CYCLOBENZAPRINE HCL 10 MG
10 TABLET ORAL 3 TIMES DAILY
Qty: 10 TABLET | Refills: 0 | Status: SHIPPED | OUTPATIENT
Start: 2024-11-19

## 2024-11-19 RX ORDER — IBUPROFEN 600 MG/1
600 TABLET, FILM COATED ORAL EVERY 6 HOURS
Qty: 60 TABLET | Refills: 0 | Status: SHIPPED | OUTPATIENT
Start: 2024-11-19 | End: 2024-11-19

## 2024-11-19 RX ORDER — BUTALBITAL, ACETAMINOPHEN AND CAFFEINE 300; 40; 50 MG/1; MG/1; MG/1
1 CAPSULE ORAL EVERY 6 HOURS PRN
Qty: 10 CAPSULE | Refills: 0 | Status: SHIPPED | OUTPATIENT
Start: 2024-11-19 | End: 2024-11-19 | Stop reason: HOSPADM

## 2024-11-19 RX ORDER — BUTALBITAL, ACETAMINOPHEN AND CAFFEINE 300; 40; 50 MG/1; MG/1; MG/1
1 CAPSULE ORAL EVERY 6 HOURS PRN
Qty: 10 CAPSULE | Refills: 0 | Status: SHIPPED | OUTPATIENT
Start: 2024-11-19

## 2024-11-19 RX ORDER — KETOROLAC TROMETHAMINE 30 MG/ML
30 INJECTION, SOLUTION INTRAMUSCULAR; INTRAVENOUS ONCE
Status: COMPLETED | OUTPATIENT
Start: 2024-11-19 | End: 2024-11-19

## 2024-11-19 RX ORDER — ACETAMINOPHEN 325 MG/1
650 TABLET ORAL EVERY 6 HOURS PRN
Status: DISCONTINUED | OUTPATIENT
Start: 2024-11-19 | End: 2024-11-19 | Stop reason: HOSPADM

## 2024-11-19 RX ADMIN — ACETAMINOPHEN 650 MG: 325 TABLET ORAL at 04:48

## 2024-11-19 RX ADMIN — KETOROLAC TROMETHAMINE 30 MG: 30 INJECTION, SOLUTION INTRAMUSCULAR; INTRAVENOUS at 09:02

## 2024-11-19 RX ADMIN — METOCLOPRAMIDE 10 MG: 5 INJECTION, SOLUTION INTRAMUSCULAR; INTRAVENOUS at 04:48

## 2024-11-19 RX ADMIN — CYCLOBENZAPRINE HYDROCHLORIDE 10 MG: 10 TABLET, FILM COATED ORAL at 09:02

## 2024-11-19 RX ADMIN — METOCLOPRAMIDE 10 MG: 5 INJECTION, SOLUTION INTRAMUSCULAR; INTRAVENOUS at 12:58

## 2024-11-19 RX ADMIN — SODIUM CHLORIDE, SODIUM LACTATE, POTASSIUM CHLORIDE, CALCIUM CHLORIDE AND DEXTROSE MONOHYDRATE 75 ML/HR: 5; 600; 310; 30; 20 INJECTION, SOLUTION INTRAVENOUS at 04:08

## 2024-11-19 NOTE — OUTREACH NOTE
Maternal Screening Survey      Flowsheet Row Responses   Facility patient discharged from? [place]   Attempt successful? No   Unsuccessful attempts Attempt 1   Revoke Admitted              David FINNEGAN - Registered Nurse

## 2024-11-19 NOTE — DISCHARGE INSTRUCTIONS
Keep your next appointment with Dr. Delgado.   your prescriptions for Fioricet and Flexeril at your pharmacy.  For any questions, concerns or problems at all, call your doctor immediately.

## 2024-11-19 NOTE — PROGRESS NOTES
ADELITA Zavala  Antepartum Progress Note    Chief Complaint: Neck Pain    Subjective     Pain reports that upper HA is resolved but that pain is now focalized in right neck and tension in base of skull. Denies VC, CP, SOA, RUQ pain. Reports has been tolerating Mag well. Otherwise reports normal bleeding and abdominal pain symptoms.     Objective     Vital Signs Range for the last 24 hours  Temp:  [98 °F (36.7 °C)-98.4 °F (36.9 °C)] 98 °F (36.7 °C)   BP: ()/(53-80) 111/79   Heart Rate:  [48-97] 83   Resp:  [15-18] 18   SpO2:  [91 %-99 %] 95 %       Device (Oxygen Therapy): room air       Intake/Output last 24 hours:      Intake/Output Summary (Last 24 hours) at 11/19/2024 0927  Last data filed at 11/19/2024 0830  Gross per 24 hour   Intake 2111.24 ml   Output 5700 ml   Net -3588.76 ml       Intake/Output this shift:    I/O this shift:  In: 233.3 [I.V.:233.3]  Out: 1000 [Urine:1000]    Physical Examination  Constitutional: A&Ox3. No apparent distress. Doing well.  HEENT: Normocephalic, atraumatic.  Neurological: Negative for sensory or motor deficit  Cardiovascular: RRR; negative for murmur, rubs, gallops  Respiratory: Clear to auscultation bilaterally; negative for rales, crackles or wheezes  Abdominal: nontender, soft, negative for guarding, rebound  Extremities: negative for edema and tenderness, negative Scott's  Skin: normal turgor; negative for rash or lesions  Psychiatric: normal affect, normal thought process, good insight, good judgment        Laboratory Results  WBC   Date Value Ref Range Status   11/18/2024 10.13 3.40 - 10.80 10*3/mm3 Final     RBC   Date Value Ref Range Status   11/18/2024 3.50 (L) 3.77 - 5.28 10*6/mm3 Final     Hemoglobin   Date Value Ref Range Status   11/18/2024 9.1 (L) 12.0 - 15.9 g/dL Final     Hematocrit   Date Value Ref Range Status   11/18/2024 29.7 (L) 34.0 - 46.6 % Final     MCV   Date Value Ref Range Status   11/18/2024 84.9 79.0 - 97.0 fL Final     MCH   Date Value Ref  Range Status   11/18/2024 26.0 (L) 26.6 - 33.0 pg Final     MCHC   Date Value Ref Range Status   11/18/2024 30.6 (L) 31.5 - 35.7 g/dL Final     RDW   Date Value Ref Range Status   11/18/2024 14.7 12.3 - 15.4 % Final     RDW-SD   Date Value Ref Range Status   11/18/2024 44.4 37.0 - 54.0 fl Final     MPV   Date Value Ref Range Status   11/18/2024 10.4 6.0 - 12.0 fL Final     Platelets   Date Value Ref Range Status   11/18/2024 306 140 - 450 10*3/mm3 Final     Lab Results   Component Value Date    GLUCOSE 80 11/12/2021    BUN 12 02/05/2020    CREATININE 0.70 11/18/2024     02/05/2020    K 3.5 02/05/2020     02/05/2020    CALCIUM 9.1 02/05/2020    PROTEINTOT 7.6 02/05/2020    ALBUMIN 4.10 02/05/2020    ALT 17 11/18/2024    AST 16 11/18/2024    ALKPHOS 152 (H) 11/18/2024    BILITOT 0.2 11/18/2024    GLOB 3.5 02/05/2020    AGRATIO 1.2 02/05/2020    BCR 16.7 02/05/2020    ANIONGAP 11.9 02/05/2020         Imaging:        Assessment/Plan  POD#11 s/p RCS  Postpartum Severe Preeclampsia   Headache/Neck Pain   Postop Anemia    1. Current management on [facility]    2. Postpartum Severe Preeclampsia  -IV Mag to be discontinued at 24 hours this afternoon  -BP stable since starting  -No PO antihypertensives at this time  -Repeat labs this am  -Will DC Mag at 24 hours  -Plan for close BP monitoring after Mag    3. Headache/Neck Pain  -HA resolving  -Pain now focalized to R neck  -Discussed tension component  -Prior CT scan normal  -Toradol and flexiril ordered by laborist this am  -Will assess for symptom management  -If persistent consideration of repeat imaging or neuro consult    4. Post op anemia  -Start PO ferrous sulfate    5. POD#11  -Bleeding well controlled  -Ambulating  -Voiding well    6. Diet  -Regular    7. DVT PPX  -SCDs    Dispo: Continue management inpatient. Will reassess status this afternoon      Ana Lilia Galvez DO  11/19/2024  09:27 EST

## 2024-11-19 NOTE — DISCHARGE SUMMARY
[facility]  Discharge Summary      Patient: Noelle Astudillo      MR#:[id number]      Service: Obstetrics    Date of Admission: 2024  Date of Discharge:  2024    Admission  Diagnosis:   Problems Addressed this Visit          Gravid and     * (Principal) Postpartum hypertension - Primary    Relevant Medications    butalbital-acetaminophen-caffeine (Fioricet) -40 MG capsule capsule     Diagnoses         Codes Comments    Postpartum hypertension    -  Primary ICD-10-CM: O16.5  ICD-9-CM: 642.94           Discharge Diagnosis:   1. Postpartum hypertension          Presenting Problem/History of Present Illness  Postpartum hypertension [O16.5]         Hospital Course  Patient is a 29 y.o. female presented for headache and diagnosed with postpartum severe preeclampsia on POD#10 s/p RCS. She was previously seen in ED with negative CT scan. She was admitted to [facility] for postpartum magnesium therapy. Blood pressures stabilized following magnesium and did not require PO antihypertensives. She was given flexirel and toradol for pain control which resulted in resolution of headache and new onset neck pain this am. Upon discharge denied further headache and neck pain. MRI negative for acute thrombosis. Denied visual changes, chest pain, shortness of air, RUQ pain. Bps stable s/p magnesium. Vaginal bleeding light and incision healing well. Desires discharge home. Has follow up in office on Friday.         Procedures Performed         Consults:   Consults       No orders found for last 30 day(s).            Pertinent Test Results: labs:      Labs:  Lab Results   Component Value Date    WBC 10.39 2024    HGB 9.5 (L) 2024    HCT 30.9 (L) 2024    MCV 84.4 2024     2024    CREATININE 0.71 2024    URICACID 5.9 (H) 2024    AST 12 2024    ALT 14 2024     (H) 2024     Results from last 7 days   Lab Units 24  1500   ABO TYPING   A   RH TYPING  Negative   ANTIBODY SCREEN  Negative       Discharge Disposition:  To Home    Condition on Discharge:  Stable    Vital Signs  Temp:  [98 °F (36.7 °C)-98.3 °F (36.8 °C)] 98.2 °F (36.8 °C)  Heart Rate:  [41-97] 86  Resp:  [15-18] 18  BP: ()/(53-80) 125/65    Physical Examination    Constitutional: A&Ox3. No apparent distress. Doing well.  HEENT: Normocephalic, atraumatic.  Neurological: Negative for sensory or motor deficit  Cardiovascular: RRR; negative for murmur, rubs, gallops  Respiratory: Clear to auscultation bilaterally; negative for rales, crackles or wheezes  Abdominal: Nontender, soft, negative for guarding, rebound  Extremities: Negative for edema and tenderness, negative Scott's  Skin: Incision clean and dry. intact. dermabond applied overtop. No bleeding, oozing, drainage, opening, erythema, warmth, ecchymoses. Normal turgor; negative for rash or lesions  Psychiatric: Normal affect, normal thought process, good insight, good judgment  Fundus: Firm, below umbilicus, midline      Discharge Disposition  Home or Self Care home    Discharge Medications     Discharge Medications        New Medications        Instructions Start Date   butalbital-acetaminophen-caffeine -40 MG capsule capsule  Commonly known as: Fioricet   1 capsule, Oral, Every 6 Hours PRN      cyclobenzaprine 10 MG tablet  Commonly known as: FLEXERIL   10 mg, Oral, 3 Times Daily             Continue These Medications        Instructions Start Date   ferrous sulfate 325 (65 FE) MG tablet   325 mg, Oral, Daily With Breakfast      ibuprofen 600 MG tablet  Commonly known as: ADVIL,MOTRIN   600 mg, Oral, Every 6 Hours      Stool Softener 100 MG capsule  Generic drug: docusate sodium   100 mg, Oral, 2 Times Daily PRN      Vitafol Gummies 3.33-0.333-34.8 MG chewable tablet   2 tablets, Oral, Daily               Discharge Diet:  regular    Activity at Discharge:  light, pelvic rest, no lifting >10lbs    Postop  Instructions:  Follow up in office at [facility] in 1 week for postoperative evaluation. Contact office if complications arise before that time. Discussed symptoms of fevers, chills, abdominal pain, increased bleeding. Reviewed bleeding soaking through a pad every 1-2 hours, passage of blood clots larger than the size of her palm, signs of infection including fevers or chills, abrupt increases in pain, opening of incision. If symptoms occur contact office or if severe present to emergency department. Also discussed postpartum blues, depression, SI/HI. N  Nothing vaginally for the next 6 weeks including sexual intercourse, tampons, douches, soaking in a bathtub or hot tub. No lifting >10lbs. Resume home medications.    Follow-up Appointments  [facility] in 1 week    Test Results Pending at Discharge    None     Ana Lilia Galvez DO  11/19/24  17:51 EST    Time: Discharge 20 min

## (undated) DEVICE — APPL CHLORAPREP TINTED 26ML TEAL

## (undated) DEVICE — SUT GUT CHRM 1 CTX 36IN 905H

## (undated) DEVICE — PK C/SECT 10

## (undated) DEVICE — GLV SURG BIOGEL LTX PF 6 1/2

## (undated) DEVICE — 4-PORT MANIFOLD: Brand: NEPTUNE 2

## (undated) DEVICE — SUT VIC 3/0 CT1 27IN DYED J338H

## (undated) DEVICE — PENCL SMOKE/EVAC MEGADYNE TELESCP 10FT

## (undated) DEVICE — SOL IRR H2O BO 1000ML STRL

## (undated) DEVICE — GLV SURG BIOGEL LTX PF 6

## (undated) DEVICE — TRAP FLD MINIVAC MEGADYNE 100ML

## (undated) DEVICE — PATIENT RETURN ELECTRODE, SINGLE-USE, CONTACT QUALITY MONITORING, ADULT, WITH 9FT CORD, FOR PATIENTS WEIGING OVER 33LBS. (15KG): Brand: MEGADYNE

## (undated) DEVICE — SOL IRR NACL 0.9PCT BO 1000ML

## (undated) DEVICE — CLTH CLENS READYCLEANSE PERI CARE PK/5

## (undated) DEVICE — ADHS SKIN PREMIERPRO EXOFIN TOPICAL HI/VISC .5ML

## (undated) DEVICE — TRY SPINE BLCK WHITACRE 25G 3X5IN

## (undated) DEVICE — LARGE, DISPOSABLE ALEXIS O C-SECTION PROTECTOR - RETRACTOR: Brand: ALEXIS ® O C-SECTION PROTECTOR - RETRACTOR

## (undated) DEVICE — SUT GUT CHRM 2/0 CT1 27IN 811H

## (undated) DEVICE — MAT PREVALON MOBL TRANSFR AIR W/PAD REPROC 39X81IN

## (undated) DEVICE — COATED VICRYL  (POLYGLACTIN 910) SUTURE, VIOLET BRAIDED, STERILE, SYNTHETIC ABSORBABLE SUTURE: Brand: COATED VICRYL

## (undated) DEVICE — SUT MONOCRYL SZ 4 0 18IN PS1 Y682H BX/36